# Patient Record
Sex: MALE | Race: ASIAN | NOT HISPANIC OR LATINO | Employment: PART TIME | URBAN - METROPOLITAN AREA
[De-identification: names, ages, dates, MRNs, and addresses within clinical notes are randomized per-mention and may not be internally consistent; named-entity substitution may affect disease eponyms.]

---

## 2022-06-16 ENCOUNTER — TRANSFERRED RECORDS (OUTPATIENT)
Dept: HEALTH INFORMATION MANAGEMENT | Facility: CLINIC | Age: 22
End: 2022-06-16

## 2022-06-27 ENCOUNTER — TRANSFERRED RECORDS (OUTPATIENT)
Dept: HEALTH INFORMATION MANAGEMENT | Facility: CLINIC | Age: 22
End: 2022-06-27

## 2022-06-29 ENCOUNTER — MEDICAL CORRESPONDENCE (OUTPATIENT)
Dept: HEALTH INFORMATION MANAGEMENT | Facility: CLINIC | Age: 22
End: 2022-06-29

## 2022-07-06 ENCOUNTER — TRANSCRIBE ORDERS (OUTPATIENT)
Dept: OTHER | Age: 22
End: 2022-07-06

## 2022-07-06 DIAGNOSIS — M54.17 LUMBOSACRAL RADICULOPATHY AT S1: ICD-10-CM

## 2022-07-06 DIAGNOSIS — M51.27 LUMBOSACRAL DISC HERNIATION: Primary | ICD-10-CM

## 2022-07-09 NOTE — TELEPHONE ENCOUNTER
Action July 8, 2022 10:27 PM MT   Action Taken CSS sent a req for imgs from [facility] [phone].     Action July 11, 2022 8:13 AM MT   Action Taken CSS recvd and resolved imgs to PACS.      DIAGNOSIS: Low back pain for 1 year / MRI @ [facility] / Dr. Krishna Julian - [facility] / Ortho consult   APPOINTMENT DATE: 07/11/2022   NOTES STATUS DETAILS   OFFICE NOTE from referring provider SPINE REFERRAL/Media Tab 07/06/2022, 06/16/2022 - Krishna Julian MD - [facility]   OFFICE NOTE from other specialist Care Everywhere 05/04/2021 - Cem Briceno PA-C - [facility]   LABS     CBC/DIFF Care Everywhere 05/22/2021   MRI PACS [facility]: 06/27/2022 - [facility]

## 2022-07-11 ENCOUNTER — PRE VISIT (OUTPATIENT)
Dept: ORTHOPEDICS | Facility: CLINIC | Age: 22
End: 2022-07-11

## 2022-07-11 ENCOUNTER — OFFICE VISIT (OUTPATIENT)
Dept: ORTHOPEDICS | Facility: CLINIC | Age: 22
End: 2022-07-11
Payer: COMMERCIAL

## 2022-07-11 VITALS — BODY MASS INDEX: 27.92 KG/M2 | HEIGHT: 70 IN | WEIGHT: 195 LBS

## 2022-07-11 DIAGNOSIS — M54.50 LUMBAR PAIN: Primary | ICD-10-CM

## 2022-07-11 DIAGNOSIS — M54.17 LUMBOSACRAL RADICULOPATHY AT S1: ICD-10-CM

## 2022-07-11 DIAGNOSIS — M51.27 LUMBOSACRAL DISC HERNIATION: ICD-10-CM

## 2022-07-11 PROCEDURE — 99204 OFFICE O/P NEW MOD 45 MIN: CPT | Performed by: PHYSICIAN ASSISTANT

## 2022-07-11 RX ORDER — ALBUTEROL SULFATE 90 UG/1
2 AEROSOL, METERED RESPIRATORY (INHALATION) EVERY 4 HOURS PRN
COMMUNITY
Start: 2022-05-20

## 2022-07-11 RX ORDER — FLUTICASONE PROPIONATE AND SALMETEROL 50; 250 UG/1; UG/1
POWDER RESPIRATORY (INHALATION) 2 TIMES DAILY
Status: ON HOLD | COMMUNITY
Start: 2022-06-29 | End: 2022-08-12

## 2022-07-11 NOTE — PROGRESS NOTES
Spine Surgery Consultation    REFERRING PHYSICIAN: Krishna Julian   PRIMARY CARE PHYSICIAN: No primary care provider on file.           Chief Complaint:   Consult (Low back pain, has been having sciatic pain on the right for the past year and has gotten worse about 6 months ago, has tried PT and some prednisone with little relief. No previous surgeries on his spine )      History of Present Illness:  Symptom Profile Including: location of symptoms, onset, severity, exacerbating/alleviating factors, previous treatments:        Rishi Noguera is a 22 year old male who presents today for evaluation of right radicular symptoms.  Patient says that he initially injured his low back about 1 year ago when he was working out.  Things initially got better on their own, but for the past 6 months things have been gradually worsening.  He did physical therapy for over a month without improvement of symptoms.  He had Medrol Dosepak which helped for only a short-term.  He has also tried Tylenol with codeine which helps as needed to help with sleep.  Pain is localized to right leg radiating from buttock into posterior lateral thigh and calf and down to the bottom of his foot in an S1 distribution.  He denies left leg symptoms.  Denies weakness in the legs.  Denies bowel/bladder incontinence or retention.  No other red flag symptoms.    Past treatments tried:  - Physical therapy: Just completed, minimal relief  - Injections: None  - Medications: Medrol Dosepak, Tylenol with codeine    PMH:  Asthma    Social:  Denies tobacco product usage  Occasional marijuana use  Works at [employer] in [employer] department, some heavy lifting.         Past Medical History:   History reviewed. No pertinent past medical history.         Past Surgical History:   History reviewed. No pertinent surgical history.         Social History:     Social History     Tobacco Use     Smoking status: Never Smoker     Smokeless tobacco: Never Used   Substance Use  "Topics     Alcohol use: Not on file            Family History:   History reviewed. No pertinent family history.         Allergies:   No Known Allergies         Medications:     Current Outpatient Medications   Medication     ADVAIR DISKUS 250-50 MCG/ACT inhaler     albuterol (PROAIR HFA/PROVENTIL HFA/VENTOLIN HFA) 108 (90 Base) MCG/ACT inhaler     No current facility-administered medications for this visit.             Review of Systems:     A 10 point ROS was performed and reviewed. Specific responses to these questions are noted at the end of the document.         Physical Exam:     PHYSICAL EXAM:   Constitutional - Patient is healthy, well-nourished and appears stated age.    Vitals: Ht 1.778 m (5' 10\")   Wt 88.5 kg (195 lb)   BMI 27.98 kg/m     Respiratory - Patient is breathing normally and in no respiratory distress.   Skin - No suspicious rashes or lesions.   Psychiatric - Normal mood and affect.   Cardiovascular - Extremities warm and well perfused.   Eyes - Visual acuity is normal to the written word.   ENT - Hearing intact to the spoken word.   GI - No abdominal distention.   Musculoskeletal - Non-antalgic gait without use of assistive devices.        Thoracic Spine:    Appearance - Normal    Palpation - Non-tender to palpation    Strength/ROM - deferred            Lumbar Spine:    Appearance - Normal     Palpation - Non-tender to palpation    ROM - Full     Motor -no weakness with heel walk or toe walk bilaterally.       LOWER EXTREMITY Left Right   Hip flexion 5/5 5/5   Knee flexion 5/5 5/5   Knee extension 5/5 5/5   Ankle dorsiflexion 5/5 5/5   Ankle plantarflexion 5/5 5/5   Great toe extension 5/5 5/5        Special tests -     Straight leg raise - positive right     Neurologic - Sensation intact to light touch bilaterally. Achilles and patellar reflexes +1 bilateral. Babinski downdoing. 0 beats clonus        Alignment:  Patient stands with a neutral standing sagittal balance.         Imaging:   We " ordered and independently reviewed new radiographs at this clinic visit. The results were discussed with the patient.  Findings include:    7/11/2022 XR lumbar spine AP/lateral views: No fracture.  No spondylolisthesis.  No scoliosis.  Neutral sagittal alignment.    6/27/2022 MRI lumbar spine without contrast: Right paracentral disc herniation L5-S1 with impingement of descending S1 nerve root.             Assessment and Plan:   Assessment:  22 year old male with right S1 radiculopathy due to right L5-S1 disc herniation      Plan:  Reviewed imaging with patient. He has significant herniation to explain his symptoms. He has exhausted non-operative treatment (physical therapy, medications, rest) options for over a year without improvement. Surgery is a reasonable next step. Recommend decompression surgery. We will start the planning steps for this.    Risks of this surgery include risk of infection, risk of dural tear resulting in CSF leak which might result in headaches, or possible need for lumbar drain, or possible revision surgery in the setting of a persistent leak. Risk of hematoma or seroma resulting in wound complications.  Possible nerve root injury resulting in numbness weakness or paralysis into the arms or legs. Possible radiculitis which could result in similar symptoms or could result in significant neurogenic type pain. There is also a risk of delayed onset nerve root pals or radiculitis, which I explained is potentially due to stretch injury or possibly due to delayed onset swelling, and is sometimes temporary but can also be permanent.  Risk of incomplete decompression which might require revision surgery in the future.   Risk of incomplete relief of symptoms possibly requiring revision surgery in the future. Furthermore, although rare, there are risks of major vessel injury such as to the major vessels anteriorly or to the bowel from the surgery.  Sometimes this can happen if an instrument is passed  anteriorly through the disc space. There is a risk of blood clots in the legs or the lungs.  Lastly, although rare, there are certainly risks of the anesthetic including stroke heart attack and death. Patient understands risks and wishes to proceed with surgery.  - PAC  - surgery: MIS L5-S1 Right microdiscectomy    Patient seen and plan discussed with Dr. Mabry.   Delma Mckenzie (precious Shetty), PA-C    Respectfully,  Koby Mabry MD  Spine Surgery  [facility]    I, Koby Mabry MD, saw and evaluated Rishi Noguera  2000.  I have reviewed and discussed with the advanced practice provider their history, physical and plan.    I have personally reviewed the imaging, history, and physical exam.    I personally provided substantive care for this patient, including personally interpreting the imaging.  In addition, I formulated the entire plan noted above and dictated this to the PA/APRN so they could document it in the note. The REYNALDO is acting as a Scribe for this note. The plan represents my own Medical Decision making, which I determined in its entirety.      Koby Mabry MD

## 2022-07-11 NOTE — NURSING NOTE
"Reason For Visit:   Chief Complaint   Patient presents with     Consult     Low back pain, has been having sciatic pain on the right for the past year and has gotten worse about 6 months ago, has tried PT and some prednisone with little relief. No previous surgeries on his spine        Primary MD: No primary care provider on file.  Ref. MD: Iesha    ?  No    Date of injury: about 1 year ago   Type of injury: chronic .  Date of surgery: none   Type of surgery: none .  Smoker: No  Request smoking cessation information: No    Ht 1.778 m (5' 10\")   Wt 88.5 kg (195 lb)   BMI 27.98 kg/m           Oswestry (JAYLYN) Questionnaire    No flowsheet data found.         Neck Disability Index (NDI) Questionnaire    No flowsheet data found.                Promis 10 Assessment    No flowsheet data found.             Norman Allen, ATC  "

## 2022-07-11 NOTE — LETTER
7/11/2022         RE: Rishi Noguera  [address] [address]        Dear Colleague,    Thank you for referring your patient, Rishi Noguera, to the [facility] [place]. Please see a copy of my visit note below.    Spine Surgery Consultation    REFERRING PHYSICIAN: Krishna Julian   PRIMARY CARE PHYSICIAN: No primary care provider on file.           Chief Complaint:   Consult (Low back pain, has been having sciatic pain on the right for the past year and has gotten worse about 6 months ago, has tried PT and some prednisone with little relief. No previous surgeries on his spine )      History of Present Illness:  Symptom Profile Including: location of symptoms, onset, severity, exacerbating/alleviating factors, previous treatments:        iRshi Noguera is a 22 year old male who presents today for evaluation of right radicular symptoms.  Patient says that he initially injured his low back about 1 year ago when he was working out.  Things initially got better on their own, but for the past 6 months things have been gradually worsening.  He did physical therapy for over a month without improvement of symptoms.  He had Medrol Dosepak which helped for only a short-term.  He has also tried Tylenol with codeine which helps as needed to help with sleep.  Pain is localized to right leg radiating from buttock into posterior lateral thigh and calf and down to the bottom of his foot in an S1 distribution.  He denies left leg symptoms.  Denies weakness in the legs.  Denies bowel/bladder incontinence or retention.  No other red flag symptoms.    Past treatments tried:  - Physical therapy: Just completed, minimal relief  - Injections: None  - Medications: Medrol Dosepak, Tylenol with codeine    PMH:  Asthma    Social:  Denies tobacco product usage  Occasional marijuana use  Works at [employer] in [employer] department, some heavy lifting.         Past Medical History:   History reviewed. No  "pertinent past medical history.         Past Surgical History:   History reviewed. No pertinent surgical history.         Social History:     Social History     Tobacco Use     Smoking status: Never Smoker     Smokeless tobacco: Never Used   Substance Use Topics     Alcohol use: Not on file            Family History:   History reviewed. No pertinent family history.         Allergies:   No Known Allergies         Medications:     Current Outpatient Medications   Medication     ADVAIR DISKUS 250-50 MCG/ACT inhaler     albuterol (PROAIR HFA/PROVENTIL HFA/VENTOLIN HFA) 108 (90 Base) MCG/ACT inhaler     No current facility-administered medications for this visit.             Review of Systems:     A 10 point ROS was performed and reviewed. Specific responses to these questions are noted at the end of the document.         Physical Exam:     PHYSICAL EXAM:   Constitutional - Patient is healthy, well-nourished and appears stated age.    Vitals: Ht 1.778 m (5' 10\")   Wt 88.5 kg (195 lb)   BMI 27.98 kg/m     Respiratory - Patient is breathing normally and in no respiratory distress.   Skin - No suspicious rashes or lesions.   Psychiatric - Normal mood and affect.   Cardiovascular - Extremities warm and well perfused.   Eyes - Visual acuity is normal to the written word.   ENT - Hearing intact to the spoken word.   GI - No abdominal distention.   Musculoskeletal - Non-antalgic gait without use of assistive devices.        Thoracic Spine:    Appearance - Normal    Palpation - Non-tender to palpation    Strength/ROM - deferred            Lumbar Spine:    Appearance - Normal     Palpation - Non-tender to palpation    ROM - Full     Motor -no weakness with heel walk or toe walk bilaterally.       LOWER EXTREMITY Left Right   Hip flexion 5/5 5/5   Knee flexion 5/5 5/5   Knee extension 5/5 5/5   Ankle dorsiflexion 5/5 5/5   Ankle plantarflexion 5/5 5/5   Great toe extension 5/5 5/5        Special tests -     Straight leg raise - " positive right     Neurologic - Sensation intact to light touch bilaterally. Achilles and patellar reflexes +1 bilateral. Babinski downdoing. 0 beats clonus        Alignment:  Patient stands with a neutral standing sagittal balance.         Imaging:   We ordered and independently reviewed new radiographs at this clinic visit. The results were discussed with the patient.  Findings include:    7/11/2022 XR lumbar spine AP/lateral views: No fracture.  No spondylolisthesis.  No scoliosis.  Neutral sagittal alignment.    6/27/2022 MRI lumbar spine without contrast: Right paracentral disc herniation L5-S1 with impingement of descending S1 nerve root.             Assessment and Plan:   Assessment:  22 year old male with right S1 radiculopathy due to right L5-S1 disc herniation      Plan:  Reviewed imaging with patient. He has significant herniation to explain his symptoms. He has exhausted non-operative treatment (physical therapy, medications, rest) options for over a year without improvement. Surgery is a reasonable next step. Recommend decompression surgery. We will start the planning steps for this.    Risks of this surgery include risk of infection, risk of dural tear resulting in CSF leak which might result in headaches, or possible need for lumbar drain, or possible revision surgery in the setting of a persistent leak. Risk of hematoma or seroma resulting in wound complications.  Possible nerve root injury resulting in numbness weakness or paralysis into the arms or legs. Possible radiculitis which could result in similar symptoms or could result in significant neurogenic type pain. There is also a risk of delayed onset nerve root pals or radiculitis, which I explained is potentially due to stretch injury or possibly due to delayed onset swelling, and is sometimes temporary but can also be permanent.  Risk of incomplete decompression which might require revision surgery in the future.   Risk of incomplete relief of  symptoms possibly requiring revision surgery in the future. Furthermore, although rare, there are risks of major vessel injury such as to the major vessels anteriorly or to the bowel from the surgery.  Sometimes this can happen if an instrument is passed anteriorly through the disc space. There is a risk of blood clots in the legs or the lungs.  Lastly, although rare, there are certainly risks of the anesthetic including stroke heart attack and death. Patient understands risks and wishes to proceed with surgery.  - PAC  - surgery: MIS L5-S1 Right microdiscectomy    Patient seen and plan discussed with Dr. Mabry.   Delma Mckenzie (precious Shetty), TC    Respectfully,  Koby Mabry MD  Spine Surgery  [facility]    I, Koby Mabry MD, saw and evaluated Rishi Noguera  2000.  I have reviewed and discussed with the advanced practice provider their history, physical and plan.    I have personally reviewed the imaging, history, and physical exam.    I personally provided substantive care for this patient, including personally interpreting the imaging.  In addition, I formulated the entire plan noted above and dictated this to the PA/APRN so they could document it in the note. The REYNALDO is acting as a Scribe for this note. The plan represents my own Medical Decision making, which I determined in its entirety.      Koby Mabry MD

## 2022-07-14 ENCOUNTER — TELEPHONE (OUTPATIENT)
Dept: ORTHOPEDICS | Facility: CLINIC | Age: 22
End: 2022-07-14

## 2022-07-14 NOTE — TELEPHONE ENCOUNTER
FUTURE VISIT INFORMATION      SURGERY INFORMATION:    Date: 22    Location: ur or    Surgeon:  Koby Mabry MD    Anesthesia Type:  general    Procedure: Minimally Invasive Right Lumbar 5 to Sacral 1 Microdiscectomy with Microscope and METRX tubes    Consult: ov     RECORDS REQUESTED FROM:       Most recent EKG+ Tracin21-

## 2022-07-14 NOTE — TELEPHONE ENCOUNTER
Phoned patient to schedule surgery with Dr Mabry. I left him my direct number to call back at his convenience. [phone]

## 2022-07-14 NOTE — TELEPHONE ENCOUNTER
Patient is scheduled for surgery with Dr. Mabry    Spoke with: Patient    Date of Surgery: 8/12/22    Location: [place]    Post op: 6 weeks    Pre op with Provider: Complete    H&P: Scheduled with PAC 7/22/22    Pre-procedure COVID-19 Test: Will do home test    Additional imaging/appointments: N/A    Surgery packet: Received in clinic     Additional comments: N/A

## 2022-07-21 ENCOUNTER — TELEPHONE (OUTPATIENT)
Dept: ORTHOPEDICS | Facility: CLINIC | Age: 22
End: 2022-07-21

## 2022-07-21 NOTE — TELEPHONE ENCOUNTER
RN called and spoke with Ashanti. Gave her Carmelina D number to call.  Ashanti expressed understanding.    Dianna Monzon RN         Health Call Center    Phone Message    May a detailed message be left on voicemail: no     Reason for Call: Other: Ashanti @ [facility] doing prior auth for patient's surgery. She needs:  CPT code surgery will be under  Name of hospital where patient is having surgery    Action Taken: Message routed to:  [facility] ([facility] [[email or web address]] ORTHO    Travel Screening: Not Applicable

## 2022-07-22 ENCOUNTER — LAB (OUTPATIENT)
Dept: LAB | Facility: CLINIC | Age: 22
End: 2022-07-22
Payer: COMMERCIAL

## 2022-07-22 ENCOUNTER — PRE VISIT (OUTPATIENT)
Dept: SURGERY | Facility: CLINIC | Age: 22
End: 2022-07-22

## 2022-07-22 ENCOUNTER — ANESTHESIA EVENT (OUTPATIENT)
Dept: SURGERY | Facility: CLINIC | Age: 22
End: 2022-07-22

## 2022-07-22 ENCOUNTER — OFFICE VISIT (OUTPATIENT)
Dept: SURGERY | Facility: CLINIC | Age: 22
End: 2022-07-22
Payer: COMMERCIAL

## 2022-07-22 VITALS
SYSTOLIC BLOOD PRESSURE: 121 MMHG | WEIGHT: 197.7 LBS | OXYGEN SATURATION: 95 % | TEMPERATURE: 98 F | HEART RATE: 71 BPM | DIASTOLIC BLOOD PRESSURE: 83 MMHG | RESPIRATION RATE: 20 BRPM | HEIGHT: 69 IN | BODY MASS INDEX: 29.28 KG/M2

## 2022-07-22 DIAGNOSIS — Z01.818 PRE-OP EXAMINATION: ICD-10-CM

## 2022-07-22 DIAGNOSIS — Z01.818 PRE-OP EXAMINATION: Primary | ICD-10-CM

## 2022-07-22 DIAGNOSIS — M51.27 LUMBOSACRAL DISC HERNIATION: ICD-10-CM

## 2022-07-22 LAB
ANION GAP SERPL CALCULATED.3IONS-SCNC: 7 MMOL/L (ref 3–14)
BUN SERPL-MCNC: 10 MG/DL (ref 7–30)
CALCIUM SERPL-MCNC: 9.4 MG/DL (ref 8.5–10.1)
CHLORIDE BLD-SCNC: 107 MMOL/L (ref 94–109)
CO2 SERPL-SCNC: 27 MMOL/L (ref 20–32)
CREAT SERPL-MCNC: 0.8 MG/DL (ref 0.66–1.25)
ERYTHROCYTE [DISTWIDTH] IN BLOOD BY AUTOMATED COUNT: 12.5 % (ref 10–15)
GFR SERPL CREATININE-BSD FRML MDRD: >90 ML/MIN/1.73M2
GLUCOSE BLD-MCNC: 102 MG/DL (ref 70–99)
HCT VFR BLD AUTO: 50.2 % (ref 40–53)
HGB BLD-MCNC: 17.2 G/DL (ref 13.3–17.7)
MCH RBC QN AUTO: 29 PG (ref 26.5–33)
MCHC RBC AUTO-ENTMCNC: 34.3 G/DL (ref 31.5–36.5)
MCV RBC AUTO: 85 FL (ref 78–100)
PLATELET # BLD AUTO: 300 10E3/UL (ref 150–450)
POTASSIUM BLD-SCNC: 4.4 MMOL/L (ref 3.4–5.3)
RBC # BLD AUTO: 5.93 10E6/UL (ref 4.4–5.9)
SODIUM SERPL-SCNC: 141 MMOL/L (ref 133–144)
WBC # BLD AUTO: 6.7 10E3/UL (ref 4–11)

## 2022-07-22 PROCEDURE — 99203 OFFICE O/P NEW LOW 30 MIN: CPT | Performed by: PHYSICIAN ASSISTANT

## 2022-07-22 PROCEDURE — 80048 BASIC METABOLIC PNL TOTAL CA: CPT | Performed by: PATHOLOGY

## 2022-07-22 PROCEDURE — 85027 COMPLETE CBC AUTOMATED: CPT | Performed by: PATHOLOGY

## 2022-07-22 PROCEDURE — 36415 COLL VENOUS BLD VENIPUNCTURE: CPT | Performed by: PATHOLOGY

## 2022-07-22 RX ORDER — MONTELUKAST SODIUM 10 MG/1
10 TABLET ORAL AT BEDTIME
Status: ON HOLD | COMMUNITY
End: 2022-08-12

## 2022-07-22 ASSESSMENT — PAIN SCALES - GENERAL: PAINLEVEL: SEVERE PAIN (6)

## 2022-07-22 NOTE — PATIENT INSTRUCTIONS
Preparing for Your Surgery      Name:  Rishi Noguera   MRN:  [id number]   :  2000   Today's Date:  2022       Arriving for surgery:  Surgery date:  22  Arrival time:  07:30 am    Restrictions due to COVID 19       Effective 22 [facility] is implementing the following visitor policy:     1 person may accompany the patient through the Pre-Op process.      That same person may wait in the Surgery Waiting room, provided there is enough room to social distance           Visitors must wear a mask.      Visitors must not be ill.        Inpatients are allowed 2 visitors per day for the duration of their stay.      Visiting hours are 8 am to 8 pm.    [employer] parking is available for anyone with mobility limitations or disabilities.  ([place]  24 hours/ 7 days a week; [facility]  7 am- 3:30 pm, Mon- Fri)    Please come to:   [facility] [address]  [address] [place], [place] [address]. [place]  [address]  -come in the front of [facility] Entrance. Park your car in the Green Lot.  -Proceed to the 3rd floor, check in at the Adult Surgery Waiting Lounge. [phone]    If an escort is needed stop at the Information Desk in the lobby. Inform the information person that you are here for surgery. An escort to the Adult Surgery Waiting Lounge will be provided.    What can I eat or drink?  -  You may eat and drink normally for up to 8 hours before your surgery.   -  You may have clear liquids until 2 hours before surgery.     Examples of clear liquids:  Water  Clear broth  Juices (apple, white grape, white cranberry  and cider) without pulp  Noncarbonated, powder based beverages  (lemonade and Kurtis-Aid)  Sodas (Sprite, 7-Up, ginger ale and seltzer)  Coffee or tea (without milk or cream)  Gatorade    -  No Alcohol for at least 24 hours before surgery     Which medicines can I take?  Hold Aspirin for 7 days before surgery.   Hold Multivitamins for 7 days  before surgery.  Hold Supplements for 7 days before surgery.       Hold Naproxen (Aleve) for 4 days before surgery.  Hold all NSAIDS for 7 days before spine surgery. (Ibuprofen, Naproxen, Celebrex, Indocin, Diclofenac).  -  PLEASE TAKE these medications the day of surgery:  Tylenol if needed; take morning medication.  Bring inhaler if using.    How do I prepare myself?  - Please take 2 showers before surgery using Scrubcare or Hibiclens soap.    Use this soap only from the neck to your toes.     Leave the soap on your skin for one minute--then rinse thoroughly.      You may use your own shampoo and conditioner; no other hair products.   - Please remove all jewelry and body piercings.  - No lotions, deodorants or fragrance.  - No makeup or fingernail polish.   - Bring your ID and insurance card.    -If you have a Deep Brain Stimulator, Spinal Cord Stimulator or any neuro stimulator device---you must bring the remote control to the hospital       ALL PATIENTS GOING HOME THE SAME DAY OF SURGERY ARE REQUIRED TO HAVE A RESPONSIBLE ADULT TO DRIVE AND BE IN ATTENDANCE WITH THEM FOR 24 HOURS FOLLOWING SURGERY.      Questions or Concerns:    - For any questions regarding the day of surgery or your hospital stay, please contact the Pre Admission Nursing Office at [phone].       - If you have health changes between today and your surgery please call your surgeon.       For questions after surgery please call your surgeons office.

## 2022-07-22 NOTE — H&P (VIEW-ONLY)
Pre-Operative H & P     CC:  Preoperative exam to assess for increased cardiopulmonary risk while undergoing surgery and anesthesia.    Date of Encounter: 7/22/2022  Primary Care Physician:  No primary care provider on file.     Reason for visit:   Encounter Diagnoses   Name Primary?     Pre-op examination Yes     Lumbosacral disc herniation        HPI  Rishi Noguera is a 22 year old male who presents for pre-operative H & P in preparation for  Procedure Information     Date/Time: 8/12/22     Procedure: Minimally Invasive Right Lumbar 5 to Sacral 1 Microdiscectomy with Microscope and METRX tubes    Anesthesia type: General     Pre-op diagnosis: Lumbosacral disc herniation    Location: [facility]    Providers: Dr. Mabry           The patient is a 22-year-old man with past medical history significant for asthma, headaches and marijuana use.  He has been having ongoing back pain that goes down his leg after working out.  He has tried physical therapy without improvement in his symptoms.  He was seen by Dr. Mabry on 7/11/2022 to discuss surgical treatment options.  The patient is now scheduled for the procedure as above.    History is obtained from the patient and chart review    Hx of abnormal bleeding or anti-platelet use: none      Past Medical History  Past Medical History:   Diagnosis Date     Asthma      Headache      Lumbar disc herniation        Past Surgical History  Past Surgical History:   Procedure Laterality Date     wisdom teeth extraction         Prior to Admission Medications  Current Outpatient Medications   Medication Sig Dispense Refill     ADVAIR DISKUS 250-50 MCG/ACT inhaler 2 times daily       albuterol (PROAIR HFA/PROVENTIL HFA/VENTOLIN HFA) 108 (90 Base) MCG/ACT inhaler as needed       montelukast (SINGULAIR) 10 MG tablet Take 10 mg by mouth At Bedtime         Allergies  No Known Allergies    Social History  Social History     Socioeconomic  History     Marital status: Single     Spouse name: Not on file     Number of children: Not on file     Years of education: Not on file     Highest education level: Not on file   Occupational History     Not on file   Tobacco Use     Smoking status: Never Smoker     Smokeless tobacco: Never Used   Substance and Sexual Activity     Alcohol use: Not on file     Drug use: Yes     Types: Marijuana     Sexual activity: Not on file   Other Topics Concern     Not on file   Social History Narrative     Not on file     Social Determinants of Health     Financial Resource Strain: Not on file   Food Insecurity: Not on file   Transportation Needs: Not on file   Physical Activity: Not on file   Stress: Not on file   Social Connections: Not on file   Intimate Partner Violence: Not on file   Housing Stability: Not on file       Family History  Family History   Problem Relation Age of Onset     Anesthesia Reaction No family hx of      Deep Vein Thrombosis (DVT) No family hx of        Review of Systems  The complete review of systems is negative other than noted in the HPI or here.   Anesthesia Evaluation   Pt has had prior anesthetic. Type of anesthetic: wisdom teeth.        ROS/MED HX  ENT/Pulmonary:     (+) Mild Persistent, asthma Treatment: Inhaler prn, Oral steroids and Inhaler daily,      Neurologic: Comment: Headache        Cardiovascular:     (+) -----Previous cardiac testing   Echo: Date: Results:    Stress Test: Date: Results:    ECG Reviewed: Date: 5/22/21 Results:  NSR    Cath: Date: Results:      METS/Exercise Tolerance: 4 - Raking leaves, gardening    Hematologic:  - neg hematologic  ROS     Musculoskeletal: Comment: Lumbosacral disc herniation      GI/Hepatic:  - neg GI/hepatic ROS     Renal/Genitourinary:  - neg Renal ROS     Endo:  - neg endo ROS     Psychiatric/Substance Use:  - neg psychiatric ROS   (+) Recreational drug usage: Cannabis.    Infectious Disease:  - neg infectious disease ROS     Malignancy:  - neg  "malignancy ROS     Other:  - neg other ROS          /83 (BP Location: Right arm, Patient Position: Chair, Cuff Size: Adult Regular)   Pulse 71   Temp 98  F (36.7  C) (Oral)   Resp 20   Ht 1.753 m (5' 9\")   Wt 89.7 kg (197 lb 11.2 oz)   SpO2 95%   BMI 29.20 kg/m      Physical Exam   Constitutional: Awake, alert, cooperative, no apparent distress, and appears stated age.  Eyes: Pupils equal, round and reactive to light, extra ocular muscles intact, sclera clear, conjunctiva normal.  HENT: Normocephalic, oral pharynx with moist mucus membranes, good dentition. No goiter appreciated.   Respiratory: Clear to auscultation bilaterally, no crackles or wheezing.  Cardiovascular: Regular rate and rhythm, normal S1 and S2, and no murmur noted.  Carotids +2, no bruits. No edema. Palpable pulses to radial  DP and PT arteries.   GI: Normal bowel sounds, soft, non-distended, non-tender, no masses palpated, no hepatosplenomegaly.    Lymph/Hematologic: No cervical lymphadenopathy and no supraclavicular lymphadenopathy.  Genitourinary:  defer  Skin: Warm and dry.  No rashes at anticipated surgical site.   Musculoskeletal: Full ROM of neck. There is no redness, warmth, or swelling of the joints. Gross motor strength is normal.    Neurologic: Awake, alert, oriented to name, place and time. Cranial nerves II-XII are grossly intact. Gait is normal.   Neuropsychiatric: Calm, cooperative. Normal affect.     Prior Labs/Diagnostic Studies   All labs and imaging personally reviewed     EKG/ stress test - if available please see in ROS above     The patient's records and results personally reviewed by this provider.     Outside records reviewed from: Care Everywhere    LAB/DIAGNOSTIC STUDIES TODAY:     Latest Reference Range & Units 07/22/22 11:58   Sodium 133 - 144 mmol/L 141   Potassium 3.4 - 5.3 mmol/L 4.4   Chloride 94 - 109 mmol/L 107   Carbon Dioxide 20 - 32 mmol/L 27   Urea Nitrogen 7 - 30 mg/dL 10   Creatinine 0.66 - 1.25 " "mg/dL 0.80   GFR Estimate >60 mL/min/1.73m2 >90   Calcium 8.5 - 10.1 mg/dL 9.4   Anion Gap 3 - 14 mmol/L 7   Glucose 70 - 99 mg/dL 102 (H)   WBC 4.0 - 11.0 10e3/uL 6.7   Hemoglobin 13.3 - 17.7 g/dL 17.2   Hematocrit 40.0 - 53.0 % 50.2   Platelet Count 150 - 450 10e3/uL 300   RBC Count 4.40 - 5.90 10e6/uL 5.93 (H)   MCV 78 - 100 fL 85   MCH 26.5 - 33.0 pg 29.0   MCHC 31.5 - 36.5 g/dL 34.3   RDW 10.0 - 15.0 % 12.5     Assessment      Rishi Noguera is a 22 year old male seen as a PAC referral for risk assessment and optimization for anesthesia.    Plan/Recommendations  Pt will be optimized for the proposed procedure.  See below for details on the assessment, risk, and preoperative recommendations    NEUROLOGY  - headaches - the patient takes PRN tylenol  -Post Op delirium risk factors:  No risk identified    ENT  - No current airway concerns.  Will need to be reassessed day of surgery.  Mallampati: I  TM: > 3    CARDIAC  - No history of CAD, Hypertension and Afib  - METS (Metabolic Equivalents)  Patient performs 4 or more METS exercise without symptoms            Total Score: 0      RCRI-Very low risk: Class 1 0.4% complication rate            Total Score: 0        PULMONARY  - Obstructive Sleep Apnea  No current risk of obstructive sleep apnea   QUIN Low Risk            Total Score: 1    QUIN: Male      - Asthma  Well controlled - the patient does report he's been having more mucous but denies URI symptoms. Lungs are clear on exam.   - Tobacco History      History   Smoking Status     Never Smoker   Smokeless Tobacco     Never Used       GI  PONV Medium Risk  Total Score: 2           1 AN PONV: Patient is not a current smoker    1 AN PONV: Intended Post Op Opioids        /RENAL  - Baseline Creatinine  1.00    ENDOCRINE    - BMI: Estimated body mass index is 29.2 kg/m  as calculated from the following:    Height as of this encounter: 1.753 m (5' 9\").    Weight as of this encounter: 89.7 kg (197 lb 11.2 oz).  Overweight " (BMI 25.0-29.9)  - No history of Diabetes Mellitus    HEME  VTE Low Risk 0.5%            Total Score: 2    VTE: Male      - No history of abnormal bleeding or antiplatelet use.    MSK  ~ Lumbosacral disc herniation - procedure as above.     OTHER  - The patient will not smoke marijuana for 24 hours prior    The patient is optimized for their procedure. AVS with information on surgery time/arrival time, meds and NPO status given by nursing staff. No further diagnostic testing indicated.      On the day of service:     Prep time: 4 minutes  Visit time: 9 minutes  Documentation time: 8 minutes  ------------------------------------------  Total time: 21 minutes      Brianna Iglesias PA-C  Preoperative Assessment Center  [facility]  [facility]  Phone: [phone]  Fax: [phone]

## 2022-07-22 NOTE — H&P
Pre-Operative H & P     CC:  Preoperative exam to assess for increased cardiopulmonary risk while undergoing surgery and anesthesia.    Date of Encounter: 7/22/2022  Primary Care Physician:  No primary care provider on file.     Reason for visit:   Encounter Diagnoses   Name Primary?     Pre-op examination Yes     Lumbosacral disc herniation        HPI  Rishi Noguera is a 22 year old male who presents for pre-operative H & P in preparation for  Procedure Information     Date/Time: 8/12/22     Procedure: Minimally Invasive Right Lumbar 5 to Sacral 1 Microdiscectomy with Microscope and METRX tubes    Anesthesia type: General     Pre-op diagnosis: Lumbosacral disc herniation    Location: [facility]    Providers: Dr. Mabry           The patient is a 22-year-old man with past medical history significant for asthma, headaches and marijuana use.  He has been having ongoing back pain that goes down his leg after working out.  He has tried physical therapy without improvement in his symptoms.  He was seen by Dr. Mabry on 7/11/2022 to discuss surgical treatment options.  The patient is now scheduled for the procedure as above.    History is obtained from the patient and chart review    Hx of abnormal bleeding or anti-platelet use: none      Past Medical History  Past Medical History:   Diagnosis Date     Asthma      Headache      Lumbar disc herniation        Past Surgical History  Past Surgical History:   Procedure Laterality Date     wisdom teeth extraction         Prior to Admission Medications  Current Outpatient Medications   Medication Sig Dispense Refill     ADVAIR DISKUS 250-50 MCG/ACT inhaler 2 times daily       albuterol (PROAIR HFA/PROVENTIL HFA/VENTOLIN HFA) 108 (90 Base) MCG/ACT inhaler as needed       montelukast (SINGULAIR) 10 MG tablet Take 10 mg by mouth At Bedtime         Allergies  No Known Allergies    Social History  Social History     Socioeconomic  History     Marital status: Single     Spouse name: Not on file     Number of children: Not on file     Years of education: Not on file     Highest education level: Not on file   Occupational History     Not on file   Tobacco Use     Smoking status: Never Smoker     Smokeless tobacco: Never Used   Substance and Sexual Activity     Alcohol use: Not on file     Drug use: Yes     Types: Marijuana     Sexual activity: Not on file   Other Topics Concern     Not on file   Social History Narrative     Not on file     Social Determinants of Health     Financial Resource Strain: Not on file   Food Insecurity: Not on file   Transportation Needs: Not on file   Physical Activity: Not on file   Stress: Not on file   Social Connections: Not on file   Intimate Partner Violence: Not on file   Housing Stability: Not on file       Family History  Family History   Problem Relation Age of Onset     Anesthesia Reaction No family hx of      Deep Vein Thrombosis (DVT) No family hx of        Review of Systems  The complete review of systems is negative other than noted in the HPI or here.   Anesthesia Evaluation   Pt has had prior anesthetic. Type of anesthetic: wisdom teeth.        ROS/MED HX  ENT/Pulmonary:     (+) Mild Persistent, asthma Treatment: Inhaler prn, Oral steroids and Inhaler daily,      Neurologic: Comment: Headache        Cardiovascular:     (+) -----Previous cardiac testing   Echo: Date: Results:    Stress Test: Date: Results:    ECG Reviewed: Date: 5/22/21 Results:  NSR    Cath: Date: Results:      METS/Exercise Tolerance: 4 - Raking leaves, gardening    Hematologic:  - neg hematologic  ROS     Musculoskeletal: Comment: Lumbosacral disc herniation      GI/Hepatic:  - neg GI/hepatic ROS     Renal/Genitourinary:  - neg Renal ROS     Endo:  - neg endo ROS     Psychiatric/Substance Use:  - neg psychiatric ROS   (+) Recreational drug usage: Cannabis.    Infectious Disease:  - neg infectious disease ROS     Malignancy:  - neg  "malignancy ROS     Other:  - neg other ROS          /83 (BP Location: Right arm, Patient Position: Chair, Cuff Size: Adult Regular)   Pulse 71   Temp 98  F (36.7  C) (Oral)   Resp 20   Ht 1.753 m (5' 9\")   Wt 89.7 kg (197 lb 11.2 oz)   SpO2 95%   BMI 29.20 kg/m      Physical Exam   Constitutional: Awake, alert, cooperative, no apparent distress, and appears stated age.  Eyes: Pupils equal, round and reactive to light, extra ocular muscles intact, sclera clear, conjunctiva normal.  HENT: Normocephalic, oral pharynx with moist mucus membranes, good dentition. No goiter appreciated.   Respiratory: Clear to auscultation bilaterally, no crackles or wheezing.  Cardiovascular: Regular rate and rhythm, normal S1 and S2, and no murmur noted.  Carotids +2, no bruits. No edema. Palpable pulses to radial  DP and PT arteries.   GI: Normal bowel sounds, soft, non-distended, non-tender, no masses palpated, no hepatosplenomegaly.    Lymph/Hematologic: No cervical lymphadenopathy and no supraclavicular lymphadenopathy.  Genitourinary:  defer  Skin: Warm and dry.  No rashes at anticipated surgical site.   Musculoskeletal: Full ROM of neck. There is no redness, warmth, or swelling of the joints. Gross motor strength is normal.    Neurologic: Awake, alert, oriented to name, place and time. Cranial nerves II-XII are grossly intact. Gait is normal.   Neuropsychiatric: Calm, cooperative. Normal affect.     Prior Labs/Diagnostic Studies   All labs and imaging personally reviewed     EKG/ stress test - if available please see in ROS above     The patient's records and results personally reviewed by this provider.     Outside records reviewed from: Care Everywhere    LAB/DIAGNOSTIC STUDIES TODAY:     Latest Reference Range & Units 07/22/22 11:58   Sodium 133 - 144 mmol/L 141   Potassium 3.4 - 5.3 mmol/L 4.4   Chloride 94 - 109 mmol/L 107   Carbon Dioxide 20 - 32 mmol/L 27   Urea Nitrogen 7 - 30 mg/dL 10   Creatinine 0.66 - 1.25 " "mg/dL 0.80   GFR Estimate >60 mL/min/1.73m2 >90   Calcium 8.5 - 10.1 mg/dL 9.4   Anion Gap 3 - 14 mmol/L 7   Glucose 70 - 99 mg/dL 102 (H)   WBC 4.0 - 11.0 10e3/uL 6.7   Hemoglobin 13.3 - 17.7 g/dL 17.2   Hematocrit 40.0 - 53.0 % 50.2   Platelet Count 150 - 450 10e3/uL 300   RBC Count 4.40 - 5.90 10e6/uL 5.93 (H)   MCV 78 - 100 fL 85   MCH 26.5 - 33.0 pg 29.0   MCHC 31.5 - 36.5 g/dL 34.3   RDW 10.0 - 15.0 % 12.5     Assessment      Rishi Noguera is a 22 year old male seen as a PAC referral for risk assessment and optimization for anesthesia.    Plan/Recommendations  Pt will be optimized for the proposed procedure.  See below for details on the assessment, risk, and preoperative recommendations    NEUROLOGY  - headaches - the patient takes PRN tylenol  -Post Op delirium risk factors:  No risk identified    ENT  - No current airway concerns.  Will need to be reassessed day of surgery.  Mallampati: I  TM: > 3    CARDIAC  - No history of CAD, Hypertension and Afib  - METS (Metabolic Equivalents)  Patient performs 4 or more METS exercise without symptoms            Total Score: 0      RCRI-Very low risk: Class 1 0.4% complication rate            Total Score: 0        PULMONARY  - Obstructive Sleep Apnea  No current risk of obstructive sleep apnea   QUIN Low Risk            Total Score: 1    QUIN: Male      - Asthma  Well controlled - the patient does report he's been having more mucous but denies URI symptoms. Lungs are clear on exam.   - Tobacco History      History   Smoking Status     Never Smoker   Smokeless Tobacco     Never Used       GI  PONV Medium Risk  Total Score: 2           1 AN PONV: Patient is not a current smoker    1 AN PONV: Intended Post Op Opioids        /RENAL  - Baseline Creatinine  1.00    ENDOCRINE    - BMI: Estimated body mass index is 29.2 kg/m  as calculated from the following:    Height as of this encounter: 1.753 m (5' 9\").    Weight as of this encounter: 89.7 kg (197 lb 11.2 oz).  Overweight " (BMI 25.0-29.9)  - No history of Diabetes Mellitus    HEME  VTE Low Risk 0.5%            Total Score: 2    VTE: Male      - No history of abnormal bleeding or antiplatelet use.    MSK  ~ Lumbosacral disc herniation - procedure as above.     OTHER  - The patient will not smoke marijuana for 24 hours prior    The patient is optimized for their procedure. AVS with information on surgery time/arrival time, meds and NPO status given by nursing staff. No further diagnostic testing indicated.      On the day of service:     Prep time: 4 minutes  Visit time: 9 minutes  Documentation time: 8 minutes  ------------------------------------------  Total time: 21 minutes      Brianna Iglesias PA-C  Preoperative Assessment Center  [facility]  Clinic [facility] Surgery Center  Phone: [phone]  Fax: [phone]

## 2022-08-12 ENCOUNTER — ANESTHESIA (OUTPATIENT)
Dept: SURGERY | Facility: CLINIC | Age: 22
End: 2022-08-12
Payer: COMMERCIAL

## 2022-08-12 ENCOUNTER — HOSPITAL ENCOUNTER (INPATIENT)
Facility: CLINIC | Age: 22
LOS: 2 days | Discharge: HOME | End: 2022-08-14
Attending: ORTHOPAEDIC SURGERY | Admitting: ORTHOPAEDIC SURGERY
Payer: COMMERCIAL

## 2022-08-12 ENCOUNTER — APPOINTMENT (OUTPATIENT)
Dept: GENERAL RADIOLOGY | Facility: CLINIC | Age: 22
End: 2022-08-12
Attending: ORTHOPAEDIC SURGERY
Payer: COMMERCIAL

## 2022-08-12 ENCOUNTER — ANESTHESIA EVENT (OUTPATIENT)
Dept: SURGERY | Facility: CLINIC | Age: 22
End: 2022-08-12
Payer: COMMERCIAL

## 2022-08-12 DIAGNOSIS — Z98.890 STATUS POST LUMBAR SPINE SURGERY FOR DECOMPRESSION OF SPINAL CORD: Primary | ICD-10-CM

## 2022-08-12 LAB — GLUCOSE BLDC GLUCOMTR-MCNC: 113 MG/DL (ref 70–99)

## 2022-08-12 PROCEDURE — 00UT0KZ SUPPLEMENT SPINAL MENINGES WITH NONAUTOLOGOUS TISSUE SUBSTITUTE, OPEN APPROACH: ICD-10-PCS | Performed by: NEUROLOGICAL SURGERY

## 2022-08-12 PROCEDURE — 63030 LAMOT DCMPRN NRV RT 1 LMBR: CPT | Mod: RT | Performed by: ORTHOPAEDIC SURGERY

## 2022-08-12 PROCEDURE — 710N000010 HC RECOVERY PHASE 1, LEVEL 2, PER MIN: Performed by: ORTHOPAEDIC SURGERY

## 2022-08-12 PROCEDURE — 999N000141 HC STATISTIC PRE-PROCEDURE NURSING ASSESSMENT: Performed by: ORTHOPAEDIC SURGERY

## 2022-08-12 PROCEDURE — 99221 1ST HOSP IP/OBS SF/LOW 40: CPT | Performed by: INTERNAL MEDICINE

## 2022-08-12 PROCEDURE — 370N000017 HC ANESTHESIA TECHNICAL FEE, PER MIN: Performed by: ORTHOPAEDIC SURGERY

## 2022-08-12 PROCEDURE — 999N000180 XR SURGERY CARM FLUORO LESS THAN 5 MIN: Mod: TC

## 2022-08-12 PROCEDURE — 250N000011 HC RX IP 250 OP 636: Performed by: PHYSICIAN ASSISTANT

## 2022-08-12 PROCEDURE — 272N000004 HC RX 272: Performed by: ORTHOPAEDIC SURGERY

## 2022-08-12 PROCEDURE — 250N000011 HC RX IP 250 OP 636: Performed by: STUDENT IN AN ORGANIZED HEALTH CARE EDUCATION/TRAINING PROGRAM

## 2022-08-12 PROCEDURE — 250N000025 HC SEVOFLURANE, PER MIN: Performed by: ORTHOPAEDIC SURGERY

## 2022-08-12 PROCEDURE — 250N000009 HC RX 250: Performed by: NURSE ANESTHETIST, CERTIFIED REGISTERED

## 2022-08-12 PROCEDURE — 250N000013 HC RX MED GY IP 250 OP 250 PS 637: Performed by: STUDENT IN AN ORGANIZED HEALTH CARE EDUCATION/TRAINING PROGRAM

## 2022-08-12 PROCEDURE — 272N000001 HC OR GENERAL SUPPLY STERILE: Performed by: ORTHOPAEDIC SURGERY

## 2022-08-12 PROCEDURE — 82962 GLUCOSE BLOOD TEST: CPT

## 2022-08-12 PROCEDURE — 250N000011 HC RX IP 250 OP 636: Performed by: NURSE ANESTHETIST, CERTIFIED REGISTERED

## 2022-08-12 PROCEDURE — 360N000084 HC SURGERY LEVEL 4 W/ FLUORO, PER MIN: Performed by: ORTHOPAEDIC SURGERY

## 2022-08-12 PROCEDURE — 01QB3ZZ REPAIR LUMBAR NERVE, PERCUTANEOUS APPROACH: ICD-10-PCS | Performed by: NEUROLOGICAL SURGERY

## 2022-08-12 PROCEDURE — C1763 CONN TISS, NON-HUMAN: HCPCS | Performed by: ORTHOPAEDIC SURGERY

## 2022-08-12 PROCEDURE — 120N000002 HC R&B MED SURG/OB UMMC

## 2022-08-12 PROCEDURE — 0SB43ZZ EXCISION OF LUMBOSACRAL DISC, PERCUTANEOUS APPROACH: ICD-10-PCS | Performed by: ORTHOPAEDIC SURGERY

## 2022-08-12 PROCEDURE — 258N000003 HC RX IP 258 OP 636: Performed by: NURSE ANESTHETIST, CERTIFIED REGISTERED

## 2022-08-12 PROCEDURE — 250N000013 HC RX MED GY IP 250 OP 250 PS 637: Performed by: PHYSICIAN ASSISTANT

## 2022-08-12 DEVICE — IMPLANTABLE DEVICE: Type: IMPLANTABLE DEVICE | Site: SPINE LUMBAR | Status: FUNCTIONAL

## 2022-08-12 RX ORDER — CEFAZOLIN SODIUM/WATER 2 G/20 ML
2 SYRINGE (ML) INTRAVENOUS SEE ADMIN INSTRUCTIONS
Status: DISCONTINUED | OUTPATIENT
Start: 2022-08-12 | End: 2022-08-12 | Stop reason: HOSPADM

## 2022-08-12 RX ORDER — ONDANSETRON 2 MG/ML
INJECTION INTRAMUSCULAR; INTRAVENOUS PRN
Status: DISCONTINUED | OUTPATIENT
Start: 2022-08-12 | End: 2022-08-12

## 2022-08-12 RX ORDER — ACETAMINOPHEN 325 MG/1
650 TABLET ORAL
Status: DISCONTINUED | OUTPATIENT
Start: 2022-08-12 | End: 2022-08-12

## 2022-08-12 RX ORDER — LIDOCAINE 40 MG/G
CREAM TOPICAL
Status: DISCONTINUED | OUTPATIENT
Start: 2022-08-12 | End: 2022-08-14 | Stop reason: HOSPADM

## 2022-08-12 RX ORDER — OXYCODONE HYDROCHLORIDE 5 MG/1
5 TABLET ORAL EVERY 4 HOURS PRN
Qty: 20 TABLET | Refills: 0 | Status: SHIPPED | OUTPATIENT
Start: 2022-08-12

## 2022-08-12 RX ORDER — NALOXONE HYDROCHLORIDE 0.4 MG/ML
0.4 INJECTION, SOLUTION INTRAMUSCULAR; INTRAVENOUS; SUBCUTANEOUS
Status: DISCONTINUED | OUTPATIENT
Start: 2022-08-12 | End: 2022-08-14 | Stop reason: HOSPADM

## 2022-08-12 RX ORDER — HYDROXYZINE HYDROCHLORIDE 25 MG/1
25 TABLET, FILM COATED ORAL EVERY 6 HOURS PRN
Status: DISCONTINUED | OUTPATIENT
Start: 2022-08-12 | End: 2022-08-14 | Stop reason: HOSPADM

## 2022-08-12 RX ORDER — LIDOCAINE HYDROCHLORIDE 20 MG/ML
INJECTION, SOLUTION INFILTRATION; PERINEURAL PRN
Status: DISCONTINUED | OUTPATIENT
Start: 2022-08-12 | End: 2022-08-12

## 2022-08-12 RX ORDER — ALBUTEROL SULFATE 90 UG/1
2 AEROSOL, METERED RESPIRATORY (INHALATION) EVERY 4 HOURS PRN
Status: DISCONTINUED | OUTPATIENT
Start: 2022-08-12 | End: 2022-08-14 | Stop reason: HOSPADM

## 2022-08-12 RX ORDER — CEFAZOLIN SODIUM 2 G/100ML
2 INJECTION, SOLUTION INTRAVENOUS EVERY 8 HOURS
Status: COMPLETED | OUTPATIENT
Start: 2022-08-12 | End: 2022-08-13

## 2022-08-12 RX ORDER — FENTANYL CITRATE 50 UG/ML
25 INJECTION, SOLUTION INTRAMUSCULAR; INTRAVENOUS
Status: DISCONTINUED | OUTPATIENT
Start: 2022-08-12 | End: 2022-08-12 | Stop reason: HOSPADM

## 2022-08-12 RX ORDER — NALOXONE HYDROCHLORIDE 0.4 MG/ML
0.4 INJECTION, SOLUTION INTRAMUSCULAR; INTRAVENOUS; SUBCUTANEOUS
Status: DISCONTINUED | OUTPATIENT
Start: 2022-08-12 | End: 2022-08-12 | Stop reason: HOSPADM

## 2022-08-12 RX ORDER — METHOCARBAMOL 750 MG/1
750 TABLET, FILM COATED ORAL
Status: DISCONTINUED | OUTPATIENT
Start: 2022-08-12 | End: 2022-08-12

## 2022-08-12 RX ORDER — OXYCODONE HYDROCHLORIDE 5 MG/1
5 TABLET ORAL EVERY 4 HOURS PRN
Status: DISCONTINUED | OUTPATIENT
Start: 2022-08-12 | End: 2022-08-14 | Stop reason: HOSPADM

## 2022-08-12 RX ORDER — POLYETHYLENE GLYCOL 3350 17 G/17G
17 POWDER, FOR SOLUTION ORAL DAILY
Status: DISCONTINUED | OUTPATIENT
Start: 2022-08-13 | End: 2022-08-14 | Stop reason: HOSPADM

## 2022-08-12 RX ORDER — ACETAMINOPHEN 325 MG/1
650 TABLET ORAL EVERY 4 HOURS PRN
Status: DISCONTINUED | OUTPATIENT
Start: 2022-08-15 | End: 2022-08-14 | Stop reason: HOSPADM

## 2022-08-12 RX ORDER — HYDROXYZINE HYDROCHLORIDE 25 MG/1
25 TABLET, FILM COATED ORAL
Status: DISCONTINUED | OUTPATIENT
Start: 2022-08-12 | End: 2022-08-12

## 2022-08-12 RX ORDER — ACETAMINOPHEN 325 MG/1
975 TABLET ORAL EVERY 8 HOURS
Status: DISCONTINUED | OUTPATIENT
Start: 2022-08-12 | End: 2022-08-14 | Stop reason: HOSPADM

## 2022-08-12 RX ORDER — AMOXICILLIN 250 MG
1-2 CAPSULE ORAL 2 TIMES DAILY
Qty: 30 TABLET | Refills: 0 | Status: SHIPPED | OUTPATIENT
Start: 2022-08-12

## 2022-08-12 RX ORDER — FAMOTIDINE 20 MG/1
20 TABLET, FILM COATED ORAL 2 TIMES DAILY
Status: DISCONTINUED | OUTPATIENT
Start: 2022-08-12 | End: 2022-08-14 | Stop reason: HOSPADM

## 2022-08-12 RX ORDER — ONDANSETRON 2 MG/ML
4 INJECTION INTRAMUSCULAR; INTRAVENOUS EVERY 6 HOURS PRN
Status: DISCONTINUED | OUTPATIENT
Start: 2022-08-12 | End: 2022-08-14 | Stop reason: HOSPADM

## 2022-08-12 RX ORDER — GABAPENTIN 100 MG/1
300 CAPSULE ORAL
Status: COMPLETED | OUTPATIENT
Start: 2022-08-12 | End: 2022-08-12

## 2022-08-12 RX ORDER — ONDANSETRON 4 MG/1
4 TABLET, ORALLY DISINTEGRATING ORAL EVERY 6 HOURS PRN
Status: DISCONTINUED | OUTPATIENT
Start: 2022-08-12 | End: 2022-08-14 | Stop reason: HOSPADM

## 2022-08-12 RX ORDER — MEPERIDINE HYDROCHLORIDE 25 MG/ML
12.5 INJECTION INTRAMUSCULAR; INTRAVENOUS; SUBCUTANEOUS
Status: DISCONTINUED | OUTPATIENT
Start: 2022-08-12 | End: 2022-08-12 | Stop reason: HOSPADM

## 2022-08-12 RX ORDER — PROPOFOL 10 MG/ML
INJECTION, EMULSION INTRAVENOUS PRN
Status: DISCONTINUED | OUTPATIENT
Start: 2022-08-12 | End: 2022-08-12

## 2022-08-12 RX ORDER — NALOXONE HYDROCHLORIDE 0.4 MG/ML
0.2 INJECTION, SOLUTION INTRAMUSCULAR; INTRAVENOUS; SUBCUTANEOUS
Status: DISCONTINUED | OUTPATIENT
Start: 2022-08-12 | End: 2022-08-12 | Stop reason: HOSPADM

## 2022-08-12 RX ORDER — TRIAMCINOLONE ACETONIDE 1 MG/G
CREAM TOPICAL 2 TIMES DAILY PRN
COMMUNITY

## 2022-08-12 RX ORDER — OXYCODONE HYDROCHLORIDE 5 MG/1
5 TABLET ORAL
Status: DISCONTINUED | OUTPATIENT
Start: 2022-08-12 | End: 2022-08-12

## 2022-08-12 RX ORDER — GABAPENTIN 100 MG/1
100 CAPSULE ORAL 3 TIMES DAILY
Status: DISCONTINUED | OUTPATIENT
Start: 2022-08-12 | End: 2022-08-13

## 2022-08-12 RX ORDER — PROCHLORPERAZINE MALEATE 10 MG
10 TABLET ORAL EVERY 6 HOURS PRN
Status: DISCONTINUED | OUTPATIENT
Start: 2022-08-12 | End: 2022-08-14 | Stop reason: HOSPADM

## 2022-08-12 RX ORDER — OXYCODONE HYDROCHLORIDE 10 MG/1
10 TABLET ORAL EVERY 4 HOURS PRN
Status: DISCONTINUED | OUTPATIENT
Start: 2022-08-12 | End: 2022-08-14 | Stop reason: HOSPADM

## 2022-08-12 RX ORDER — ACETAMINOPHEN 325 MG/1
975 TABLET ORAL ONCE
Status: COMPLETED | OUTPATIENT
Start: 2022-08-12 | End: 2022-08-12

## 2022-08-12 RX ORDER — ONDANSETRON 4 MG/1
4 TABLET, ORALLY DISINTEGRATING ORAL EVERY 30 MIN PRN
Status: DISCONTINUED | OUTPATIENT
Start: 2022-08-12 | End: 2022-08-12 | Stop reason: HOSPADM

## 2022-08-12 RX ORDER — NALOXONE HYDROCHLORIDE 0.4 MG/ML
0.2 INJECTION, SOLUTION INTRAMUSCULAR; INTRAVENOUS; SUBCUTANEOUS
Status: DISCONTINUED | OUTPATIENT
Start: 2022-08-12 | End: 2022-08-14 | Stop reason: HOSPADM

## 2022-08-12 RX ORDER — SODIUM CHLORIDE 9 MG/ML
INJECTION, SOLUTION INTRAVENOUS CONTINUOUS
Status: DISCONTINUED | OUTPATIENT
Start: 2022-08-12 | End: 2022-08-12

## 2022-08-12 RX ORDER — CEFAZOLIN SODIUM/WATER 2 G/20 ML
2 SYRINGE (ML) INTRAVENOUS
Status: COMPLETED | OUTPATIENT
Start: 2022-08-12 | End: 2022-08-12

## 2022-08-12 RX ORDER — SODIUM CHLORIDE, SODIUM LACTATE, POTASSIUM CHLORIDE, CALCIUM CHLORIDE 600; 310; 30; 20 MG/100ML; MG/100ML; MG/100ML; MG/100ML
INJECTION, SOLUTION INTRAVENOUS CONTINUOUS
Status: DISCONTINUED | OUTPATIENT
Start: 2022-08-12 | End: 2022-08-12 | Stop reason: HOSPADM

## 2022-08-12 RX ORDER — FENTANYL CITRATE 50 UG/ML
25 INJECTION, SOLUTION INTRAMUSCULAR; INTRAVENOUS EVERY 5 MIN PRN
Status: DISCONTINUED | OUTPATIENT
Start: 2022-08-12 | End: 2022-08-12

## 2022-08-12 RX ORDER — ACETAMINOPHEN 325 MG/1
650 TABLET ORAL EVERY 4 HOURS PRN
Qty: 60 TABLET | Refills: 1 | Status: SHIPPED | OUTPATIENT
Start: 2022-08-12

## 2022-08-12 RX ORDER — METHOCARBAMOL 750 MG/1
750 TABLET, FILM COATED ORAL EVERY 6 HOURS PRN
Status: DISCONTINUED | OUTPATIENT
Start: 2022-08-12 | End: 2022-08-14 | Stop reason: HOSPADM

## 2022-08-12 RX ORDER — ONDANSETRON 4 MG/1
4 TABLET, ORALLY DISINTEGRATING ORAL
Status: DISCONTINUED | OUTPATIENT
Start: 2022-08-12 | End: 2022-08-12

## 2022-08-12 RX ORDER — AMOXICILLIN 250 MG
1 CAPSULE ORAL 2 TIMES DAILY
Status: DISCONTINUED | OUTPATIENT
Start: 2022-08-12 | End: 2022-08-13

## 2022-08-12 RX ORDER — FENTANYL CITRATE 50 UG/ML
INJECTION, SOLUTION INTRAMUSCULAR; INTRAVENOUS PRN
Status: DISCONTINUED | OUTPATIENT
Start: 2022-08-12 | End: 2022-08-12

## 2022-08-12 RX ORDER — BISACODYL 10 MG
10 SUPPOSITORY, RECTAL RECTAL DAILY PRN
Status: DISCONTINUED | OUTPATIENT
Start: 2022-08-12 | End: 2022-08-14 | Stop reason: HOSPADM

## 2022-08-12 RX ORDER — SODIUM CHLORIDE, SODIUM LACTATE, POTASSIUM CHLORIDE, CALCIUM CHLORIDE 600; 310; 30; 20 MG/100ML; MG/100ML; MG/100ML; MG/100ML
INJECTION, SOLUTION INTRAVENOUS CONTINUOUS PRN
Status: DISCONTINUED | OUTPATIENT
Start: 2022-08-12 | End: 2022-08-12

## 2022-08-12 RX ORDER — ONDANSETRON 2 MG/ML
4 INJECTION INTRAMUSCULAR; INTRAVENOUS EVERY 30 MIN PRN
Status: DISCONTINUED | OUTPATIENT
Start: 2022-08-12 | End: 2022-08-12 | Stop reason: HOSPADM

## 2022-08-12 RX ORDER — DEXAMETHASONE SODIUM PHOSPHATE 4 MG/ML
INJECTION, SOLUTION INTRA-ARTICULAR; INTRALESIONAL; INTRAMUSCULAR; INTRAVENOUS; SOFT TISSUE PRN
Status: DISCONTINUED | OUTPATIENT
Start: 2022-08-12 | End: 2022-08-12

## 2022-08-12 RX ORDER — OXYCODONE HYDROCHLORIDE 5 MG/1
5 TABLET ORAL EVERY 4 HOURS PRN
Status: DISCONTINUED | OUTPATIENT
Start: 2022-08-12 | End: 2022-08-12 | Stop reason: HOSPADM

## 2022-08-12 RX ORDER — ONDANSETRON 4 MG/1
4 TABLET, ORALLY DISINTEGRATING ORAL EVERY 8 HOURS PRN
Qty: 4 TABLET | Refills: 0 | Status: SHIPPED | OUTPATIENT
Start: 2022-08-12

## 2022-08-12 RX ORDER — HYDROMORPHONE HYDROCHLORIDE 1 MG/ML
0.2 INJECTION, SOLUTION INTRAMUSCULAR; INTRAVENOUS; SUBCUTANEOUS EVERY 5 MIN PRN
Status: DISCONTINUED | OUTPATIENT
Start: 2022-08-12 | End: 2022-08-12

## 2022-08-12 RX ADMIN — Medication 20 MG: at 10:46

## 2022-08-12 RX ADMIN — Medication 2 G: at 10:07

## 2022-08-12 RX ADMIN — LIDOCAINE HYDROCHLORIDE 100 MG: 20 INJECTION, SOLUTION INFILTRATION; PERINEURAL at 10:09

## 2022-08-12 RX ADMIN — ACETAMINOPHEN 975 MG: 325 TABLET ORAL at 16:08

## 2022-08-12 RX ADMIN — PHENYLEPHRINE HYDROCHLORIDE 100 MCG: 10 INJECTION INTRAVENOUS at 10:12

## 2022-08-12 RX ADMIN — FENTANYL CITRATE 50 MCG: 50 INJECTION, SOLUTION INTRAMUSCULAR; INTRAVENOUS at 11:09

## 2022-08-12 RX ADMIN — GABAPENTIN 300 MG: 300 CAPSULE ORAL at 08:40

## 2022-08-12 RX ADMIN — METHOCARBAMOL 750 MG: 750 TABLET ORAL at 20:35

## 2022-08-12 RX ADMIN — DEXAMETHASONE SODIUM PHOSPHATE 6 MG: 4 INJECTION, SOLUTION INTRAMUSCULAR; INTRAVENOUS at 10:27

## 2022-08-12 RX ADMIN — OXYCODONE HYDROCHLORIDE 5 MG: 5 TABLET ORAL at 13:50

## 2022-08-12 RX ADMIN — Medication 5 MG: at 11:35

## 2022-08-12 RX ADMIN — HYDROMORPHONE HYDROCHLORIDE 0.2 MG: 1 INJECTION, SOLUTION INTRAMUSCULAR; INTRAVENOUS; SUBCUTANEOUS at 13:30

## 2022-08-12 RX ADMIN — ACETAMINOPHEN 975 MG: 325 TABLET ORAL at 08:40

## 2022-08-12 RX ADMIN — Medication 50 MG: at 10:13

## 2022-08-12 RX ADMIN — SODIUM CHLORIDE, POTASSIUM CHLORIDE, SODIUM LACTATE AND CALCIUM CHLORIDE: 600; 310; 30; 20 INJECTION, SOLUTION INTRAVENOUS at 10:02

## 2022-08-12 RX ADMIN — GABAPENTIN 100 MG: 100 CAPSULE ORAL at 20:20

## 2022-08-12 RX ADMIN — GABAPENTIN 100 MG: 100 CAPSULE ORAL at 14:08

## 2022-08-12 RX ADMIN — ONDANSETRON 4 MG: 2 INJECTION INTRAMUSCULAR; INTRAVENOUS at 11:19

## 2022-08-12 RX ADMIN — HYDROMORPHONE HYDROCHLORIDE 0.2 MG: 1 INJECTION, SOLUTION INTRAMUSCULAR; INTRAVENOUS; SUBCUTANEOUS at 14:09

## 2022-08-12 RX ADMIN — MIDAZOLAM 2 MG: 1 INJECTION INTRAMUSCULAR; INTRAVENOUS at 09:59

## 2022-08-12 RX ADMIN — HYDROMORPHONE HYDROCHLORIDE 0.25 MG: 1 INJECTION, SOLUTION INTRAMUSCULAR; INTRAVENOUS; SUBCUTANEOUS at 10:33

## 2022-08-12 RX ADMIN — FENTANYL CITRATE 50 MCG: 50 INJECTION, SOLUTION INTRAMUSCULAR; INTRAVENOUS at 11:18

## 2022-08-12 RX ADMIN — PROPOFOL 200 MG: 10 INJECTION, EMULSION INTRAVENOUS at 10:12

## 2022-08-12 RX ADMIN — FENTANYL CITRATE 100 MCG: 50 INJECTION, SOLUTION INTRAMUSCULAR; INTRAVENOUS at 10:09

## 2022-08-12 RX ADMIN — ACETAMINOPHEN 975 MG: 325 TABLET ORAL at 20:19

## 2022-08-12 RX ADMIN — OXYCODONE HYDROCHLORIDE 5 MG: 5 TABLET ORAL at 17:59

## 2022-08-12 RX ADMIN — SENNOSIDES AND DOCUSATE SODIUM 1 TABLET: 50; 8.6 TABLET ORAL at 20:20

## 2022-08-12 RX ADMIN — SUGAMMADEX 200 MG: 100 INJECTION, SOLUTION INTRAVENOUS at 12:12

## 2022-08-12 RX ADMIN — FAMOTIDINE 20 MG: 20 TABLET ORAL at 20:20

## 2022-08-12 RX ADMIN — Medication 5 MG: at 11:27

## 2022-08-12 RX ADMIN — HYDROMORPHONE HYDROCHLORIDE 0.25 MG: 1 INJECTION, SOLUTION INTRAMUSCULAR; INTRAVENOUS; SUBCUTANEOUS at 10:56

## 2022-08-12 RX ADMIN — HYDROXYZINE HYDROCHLORIDE 25 MG: 25 TABLET ORAL at 13:50

## 2022-08-12 RX ADMIN — HYDROMORPHONE HYDROCHLORIDE 0.2 MG: 1 INJECTION, SOLUTION INTRAMUSCULAR; INTRAVENOUS; SUBCUTANEOUS at 13:16

## 2022-08-12 RX ADMIN — CEFAZOLIN SODIUM 2 G: 2 INJECTION, SOLUTION INTRAVENOUS at 19:21

## 2022-08-12 RX ADMIN — OXYCODONE HYDROCHLORIDE 10 MG: 10 TABLET ORAL at 22:44

## 2022-08-12 ASSESSMENT — ACTIVITIES OF DAILY LIVING (ADL)
ADLS_ACUITY_SCORE: 35
ADLS_ACUITY_SCORE: 35
ADLS_ACUITY_SCORE: 37
ADLS_ACUITY_SCORE: 37
ADLS_ACUITY_SCORE: 35
ADLS_ACUITY_SCORE: 37
ADLS_ACUITY_SCORE: 35
ADLS_ACUITY_SCORE: 35

## 2022-08-12 NOTE — PLAN OF CARE
"VS:     /62 (BP Location: Right arm)   Pulse 92   Temp 98.9  F (37.2  C) (Oral)   Resp 16   Ht 1.753 m (5' 9\")   Wt 91 kg (200 lb 9.9 oz)   SpO2 95%   BMI 29.63 kg/m      Pt A/O X 4. Afebrile. VSS. Lungs- clear bilaterally  IS encouraged. Denies nausea, shortness of breath, and chest pain.     Output:       Bowels- active in all four quadrants. Torres in place.     Activity:     No activity. Pt is to remain completely flat on back for 24 hrs.      Skin: Intact    Pain:       Has pain in the back and given meds.     CMS:       CMS and Neuro's are intact. Pt states numbness in right leg.      Dressing:       UTV incisional dressing on lumbar     Diet:       Pt is on a regdiet and appetite was adequate this shift.       LDA:       PIV is patent in the R and L arm      Equipment:     Capno    PCD's on BLE's.      Plan:       Pt is able to make needs known and the call light is within the pt's reach. Continue to monitor.                  "

## 2022-08-12 NOTE — ANESTHESIA PREPROCEDURE EVALUATION
Pre-Operative H & P     CC:  Preoperative exam to assess for increased cardiopulmonary risk while undergoing surgery and anesthesia.    Date of Encounter: 7/22/2022  Primary Care Physician:  No primary care provider on file.     Reason for visit:   No diagnosis found.    MARYANN Noguera is a 22 year old male who presents for pre-operative H & P in preparation for  Procedure Information     Date/Time: 8/12/22     Procedure: Minimally Invasive Right Lumbar 5 to Sacral 1 Microdiscectomy with Microscope and METRX tubes    Anesthesia type: General     Pre-op diagnosis: Lumbosacral disc herniation    Location: [facility]    Providers: Dr. Mabry           The patient is a 22-year-old man with past medical history significant for asthma, headaches and marijuana use.  He has been having ongoing back pain that goes down his leg after working out.  He has tried physical therapy without improvement in his symptoms.  He was seen by Dr. Mabry on 7/11/2022 to discuss surgical treatment options.  The patient is now scheduled for the procedure as above.    History is obtained from the patient and chart review    Hx of abnormal bleeding or anti-platelet use: none      Past Medical History  Past Medical History:   Diagnosis Date     Asthma      Headache      Lumbar disc herniation        Past Surgical History  Past Surgical History:   Procedure Laterality Date     wisdom teeth extraction         Prior to Admission Medications  No current outpatient medications on file.       Allergies  No Known Allergies    Social History  Social History     Socioeconomic History     Marital status: Single     Spouse name: Not on file     Number of children: Not on file     Years of education: Not on file     Highest education level: Not on file   Occupational History     Not on file   Tobacco Use     Smoking status: Never Smoker     Smokeless tobacco: Never Used   Substance and Sexual Activity      Alcohol use: Not on file     Drug use: Yes     Types: Marijuana     Sexual activity: Not on file   Other Topics Concern     Not on file   Social History Narrative     Not on file     Social Determinants of Health     Financial Resource Strain: Not on file   Food Insecurity: Not on file   Transportation Needs: Not on file   Physical Activity: Not on file   Stress: Not on file   Social Connections: Not on file   Intimate Partner Violence: Not on file   Housing Stability: Not on file       Family History  Family History   Problem Relation Age of Onset     Anesthesia Reaction No family hx of      Deep Vein Thrombosis (DVT) No family hx of        Review of Systems  The complete review of systems is negative other than noted in the HPI or here.   Anesthesia Evaluation   Pt has had prior anesthetic. Type of anesthetic: wisdom teeth.    No history of anesthetic complications       ROS/MED HX  ENT/Pulmonary:     (+) Mild Persistent, asthma Treatment: Inhaler prn, Oral steroids and Inhaler daily,      Neurologic: Comment: Headache        Cardiovascular:     (+) -----Previous cardiac testing   Echo: Date: Results:    Stress Test: Date: Results:    ECG Reviewed: Date: 5/22/21 Results:  NSR    Cath: Date: Results:      METS/Exercise Tolerance: 4 - Raking leaves, gardening    Hematologic:  - neg hematologic  ROS     Musculoskeletal: Comment: Lumbosacral disc herniation      GI/Hepatic:  - neg GI/hepatic ROS     Renal/Genitourinary:  - neg Renal ROS     Endo:  - neg endo ROS     Psychiatric/Substance Use:  - neg psychiatric ROS   (+) Recreational drug usage: Cannabis.    Infectious Disease:  - neg infectious disease ROS     Malignancy:  - neg malignancy ROS     Other:  - neg other ROS          There were no vitals taken for this visit.    Physical Exam   Constitutional: Awake, alert, cooperative, no apparent distress, and appears stated age.  Eyes: Pupils equal, round and reactive to light, extra ocular muscles intact, sclera  clear, conjunctiva normal.  HENT: Normocephalic, oral pharynx with moist mucus membranes, good dentition. No goiter appreciated.   Respiratory: Clear to auscultation bilaterally, no crackles or wheezing.  Cardiovascular: Regular rate and rhythm, normal S1 and S2, and no murmur noted.  Carotids +2, no bruits. No edema. Palpable pulses to radial  DP and PT arteries.   GI: Normal bowel sounds, soft, non-distended, non-tender, no masses palpated, no hepatosplenomegaly.    Lymph/Hematologic: No cervical lymphadenopathy and no supraclavicular lymphadenopathy.  Genitourinary:  defer  Skin: Warm and dry.  No rashes at anticipated surgical site.   Musculoskeletal: Full ROM of neck. There is no redness, warmth, or swelling of the joints. Gross motor strength is normal.    Neurologic: Awake, alert, oriented to name, place and time. Cranial nerves II-XII are grossly intact. Gait is normal.   Neuropsychiatric: Calm, cooperative. Normal affect.     Prior Labs/Diagnostic Studies   All labs and imaging personally reviewed     EKG/ stress test - if available please see in ROS above     The patient's records and results personally reviewed by this provider.     Outside records reviewed from: Care Everywhere    LAB/DIAGNOSTIC STUDIES TODAY:     Latest Reference Range & Units 07/22/22 11:58   Sodium 133 - 144 mmol/L 141   Potassium 3.4 - 5.3 mmol/L 4.4   Chloride 94 - 109 mmol/L 107   Carbon Dioxide 20 - 32 mmol/L 27   Urea Nitrogen 7 - 30 mg/dL 10   Creatinine 0.66 - 1.25 mg/dL 0.80   GFR Estimate >60 mL/min/1.73m2 >90   Calcium 8.5 - 10.1 mg/dL 9.4   Anion Gap 3 - 14 mmol/L 7   Glucose 70 - 99 mg/dL 102 (H)   WBC 4.0 - 11.0 10e3/uL 6.7   Hemoglobin 13.3 - 17.7 g/dL 17.2   Hematocrit 40.0 - 53.0 % 50.2   Platelet Count 150 - 450 10e3/uL 300   RBC Count 4.40 - 5.90 10e6/uL 5.93 (H)   MCV 78 - 100 fL 85   MCH 26.5 - 33.0 pg 29.0   MCHC 31.5 - 36.5 g/dL 34.3   RDW 10.0 - 15.0 % 12.5     Assessment      Rishi Noguera is a 22 year old male  "seen as a PAC referral for risk assessment and optimization for anesthesia.    Plan/Recommendations  Pt will be optimized for the proposed procedure.  See below for details on the assessment, risk, and preoperative recommendations    NEUROLOGY  - headaches - the patient takes PRN tylenol  -Post Op delirium risk factors:  No risk identified    ENT  - No current airway concerns.  Will need to be reassessed day of surgery.  Mallampati: I  TM: > 3    CARDIAC  - No history of CAD, Hypertension and Afib  - METS (Metabolic Equivalents)  Patient performs 4 or more METS exercise without symptoms            Total Score: 0      RCRI-Very low risk: Class 1 0.4% complication rate            Total Score: 0        PULMONARY  - Obstructive Sleep Apnea  No current risk of obstructive sleep apnea   QUIN Low Risk            Total Score: 1    QUIN: Male      - Asthma  Well controlled - the patient does report he's been having more mucous but denies URI symptoms. Lungs are clear on exam.   - Tobacco History      History   Smoking Status     Never Smoker   Smokeless Tobacco     Never Used       GI  PONV Medium Risk  Total Score: 2           1 AN PONV: Patient is not a current smoker    1 AN PONV: Intended Post Op Opioids        /RENAL  - Baseline Creatinine  1.00    ENDOCRINE    - BMI: Estimated body mass index is 29.63 kg/m  as calculated from the following:    Height as of an earlier encounter on 8/12/22: 1.753 m (5' 9\").    Weight as of an earlier encounter on 8/12/22: 91 kg (200 lb 9.9 oz).  Overweight (BMI 25.0-29.9)  - No history of Diabetes Mellitus    HEME  VTE Low Risk 0.5%            Total Score: 2    VTE: Male      - No history of abnormal bleeding or antiplatelet use.    MSK  ~ Lumbosacral disc herniation - procedure as above.     OTHER  - The patient will not smoke marijuana for 24 hours prior    The patient is optimized for their procedure. AVS with information on surgery time/arrival time, meds and NPO status given by " nursing staff. No further diagnostic testing indicated.      On the day of service:     Prep time: 4 minutes  Visit time: 9 minutes  Documentation time: 8 minutes  ------------------------------------------  Total time: 21 minutes      Brianna Iglesias PA-C  Preoperative Assessment Center  [facility]  Phone: [phone]  Fax: [phone]    Physical Exam    Airway        Mallampati: I   TM distance: > 3 FB   Neck ROM: full   Mouth opening: > 3 cm    Respiratory Devices and Support         Dental  no notable dental history         Cardiovascular   cardiovascular exam normal          Pulmonary   pulmonary exam normal                  Anesthesia Plan    ASA Status:  2   NPO Status:  NPO Appropriate    Anesthesia Type: General.     - Airway: ETT   Induction: Intravenous.   Maintenance: Balanced.   Techniques and Equipment:     - Lines/Monitors: 2nd IV     Consents    Anesthesia Plan(s) and associated risks, benefits, and realistic alternatives discussed. Questions answered and patient/representative(s) expressed understanding.     - Discussed: Risks, Benefits and Alternatives for BOTH SEDATION and the PROCEDURE were discussed     - Discussed with:  Patient      - Extended Intubation/Ventilatory Support Discussed: No.      - Patient is DNR/DNI Status: No    Use of blood products discussed: Yes.     - Discussed with: Patient.     Postoperative Care    Pain management: IV analgesics, Multi-modal analgesia.   PONV prophylaxis: Ondansetron (or other 5HT-3), Dexamethasone or Solumedrol     Comments:

## 2022-08-12 NOTE — PHARMACY-ADMISSION MEDICATION HISTORY
Admission Medication History Completed by Pharmacy    See [facility] Admission Navigator for allergy information, preferred outpatient pharmacy, prior to admission medications and immunization status.     Medication History Sources:     Care Everywhere    Patient interview    Changes made to PTA medication list (reason):    Added: triamcinolone 0.1% cream apply topically to affected area as needed twice daily    Deleted: advair diskus 250-50 (patient reported not taking, was told only to take for 1 month), montelukast 10 mg (patient reported not taking, was told only to take for 1 month)    Changed: added directions for albuterol inhaler - 2 puffs every 4 hours as needed    Additional Information:    Does not currently need triamcinolone cream, uses only as needed    Prior to Admission medications    Medication Sig Last Dose Taking? Auth Provider Long Term End Date   albuterol (PROAIR HFA/PROVENTIL HFA/VENTOLIN HFA) 108 (90 Base) MCG/ACT inhaler Inhale 2 puffs into the lungs every 4 hours as needed Past Month at Unknown time Yes Reported, Patient Yes    triamcinolone (KENALOG) 0.1 % external cream Apply topically 2 times daily as needed Past Month at Unknown time Yes Unknown, Entered By History       Date completed: 08/12/22    Medication history completed by:    Karlos Coello, PharmD  PGY1 Pharmacist Resident            
Female

## 2022-08-12 NOTE — ANESTHESIA PROCEDURE NOTES
Airway       Patient location during procedure: OR       Procedure Start/Stop Times: 8/12/2022 10:15 AM  Staff -        CRNA: Anjali Taveras APRN CRNA       Performed By: CRNA  Consent for Airway        Urgency: elective  Indications and Patient Condition       Indications for airway management: gaurav-procedural       Induction type:intravenous       Mask difficulty assessment: 1 - vent by mask    Final Airway Details       Final airway type: endotracheal airway       Successful airway: ETT - single  Endotracheal Airway Details        ETT size (mm): 8.0       Cuffed: yes       Successful intubation technique: direct laryngoscopy       DL Blade Type: Pritchett 2       Grade View of Cords: 1       Adjucts: stylet       Position: Right       Measured from: lips       Secured at (cm): 24       Bite block used: Soft    Post intubation assessment        Placement verified by: capnometry, equal breath sounds and chest rise        Number of attempts at approach: 1       Number of other approaches attempted: 0       Secured with: silk tape       Ease of procedure: easy       Dentition: Intact and Unchanged    Medication(s) Administered   Medication Administration Time: 8/12/2022 10:15 AM

## 2022-08-12 NOTE — BRIEF OP NOTE
Brief Operative Note    Preop Dx:   Lumbosacral disc herniation [M51.27]  Lumbosacral radiculopathy at S1 [M54.17]  Post op Dx:   Same  Procedure:    Procedure(s):  Minimally Invasive Right Lumbar 5 to Sacral 1 Microdiscectomy  Surgeon:          Koby Martino MD - Primary     * Sae Drummond MD - Assisting  Assistants:    Delma Mckenzie PA-C  Anesthesia:   General  EBL:    <25mL  Total IV Fluids:  See Anesthesia Record  Specimens:   None  Findings:   See Operative Dictation  Complications:  Right dural tear/ nerve root injury; repaired intra-op.    Assessment and Plan: Rishi Noguera is a 22 year old male with PMH including asthma, headaches, marijuana use now s/p above procedure on 8/12/22 with Dr. Mabry.     Ortho ([facility]) Primary  Activity:   - HOB flat x 24 hours post-op due to dural tear  - after 24 hours: gradually remove HOB restrictions, see if patient tolerates. Up with assist until independent. No excessive bending or twisting. No lifting >10 lbs x 6 weeks.   Weight bearing status: WBAT.  Pain management: PO narcotics as tolerated. No NSAIDs x 3 months.   Antibiotics: Ancef x 24 hours.  Diet: Begin with clear fluids and progress diet as tolerated.   DVT prophylaxis: SCDs only. No chemical DVT ppx needed.  Imaging: none  Labs: Hgb POD#1.  Bracing/Splinting: None.  Dressings: Keep tegaderm c/d/i x 2 days.  Drains: none.  Torres catheter: none  Physical Therapy/Occupational Therapy: Eval and treat.  Cultures: none.    Consults: Hospitalist.  Follow-up: Clinic with Dr. Mabry  in 6 weeks with repeat x-rays.   Disposition: Pending progress with therapies, pain control on orals, and medical stability, anticipate discharge to home on POD #1-3.        The procedure was medically necessary for an assistant. My assistance was necessary for patient positioning, prepping and draping, soft tissue retraction, graft preparation and placement, and closure. The assistance that I provided reduced  "operative time which meant less general anesthetic for the patient.    Delma Mckenzie PA-C  Orthopedic Spine Surgery    Thank you for allowing me to participate in this patient's care. Please page me directly any questions/concerns.   Securely message with the Vocera Web Console (learn more here)  Text page via [employer] Paging/Directory    If there is no response, if it is a weekend, or if it is during evening hours, please page the orthopaedic surgery resident on call via [employer] Paging/Directory    Implants:   Implant Name Type Inv. Item Serial No.  Lot No. LRB No. Used Action   GRAFT DURAGEN 1X3\" [id number] - [id number]  GRAFT DURAGEN 1X3\" [id number]  [employer] [id number] N/A 1 Implanted       "

## 2022-08-12 NOTE — OR NURSING
PACU to Inpatient Nursing Handoff    Patient Rishi Noguera is a 22 year old male who speaks English.   Procedure Procedure(s):  Minimally Invasive Right Lumbar 5 to Sacral 1 Microdiscectomy   Surgeon(s) Primary: Koby Mabry MD  Assisting: Sae Drummond MD; Delma Mckenzie PA-C     No Known Allergies    Isolation  No active isolations     Past Medical History   has a past medical history of Asthma, Headache, and Lumbar disc herniation.    Anesthesia General   Dermatome Level     Preop Meds acetaminophen (Tylenol) - time given: 0840  gabapentin (Neurontin) - time given: 0840   Nerve block Not applicable   Intraop Meds dexamethasone (Decadron)  fentanyl (Sublimaze): 200 mcg total  hydromorphone (Dilaudid): 0.5 mg total  ondansetron (Zofran): last given at 1119  propofol gtt, muscle relaxant, fully reversed, phenylephrine bolus x1   Local Meds No   Antibiotics cefazolin (Ancef) - last given at 1007     Pain Patient Currently in Pain: yes   PACU meds  hydromorphone (Dilaudid): 0.6 mg (total dose) last given at 1410   hydroxizine (Vistaril/Atarax): 25 mg (total dose) last given at 1350   oxycodone (Roxicodone): 5 mg (total dose) last given at 1350   Neurontin 100mg PO at 1409   PCA / epidural No   Capnography     Telemetry ECG Rhythm: Normal sinus rhythm   Inpatient Telemetry Monitor Ordered? No        Labs Glucose Lab Results   Component Value Date     08/12/2022     07/22/2022       Hgb Lab Results   Component Value Date    HGB 17.2 07/22/2022       INR No results found for: INR   PACU Imaging Not applicable     Wound/Incision Incision/Surgical Site 08/12/22 Lower Back (Active)   Incision Assessment UTV 08/12/22 1330   Closure TRUDI 08/12/22 1330   Incision Drainage Amount None 08/12/22 1330   Dressing Intervention Clean, dry, intact 08/12/22 1330   Number of days: 0      CMS        Equipment ice pack   Other LDA       IV Access Peripheral IV 08/12/22 Left Hand (Active)   Site  Assessment WDL 08/12/22 1330   Line Status Saline locked 08/12/22 1330   Phlebitis Scale 0-->no symptoms 08/12/22 1330   Infiltration Scale 0 08/12/22 1330   Number of days: 0       Peripheral IV 08/12/22 Right Hand (Active)   Site Assessment WDL 08/12/22 1330   Line Status Infusing 08/12/22 1330   Phlebitis Scale 0-->no symptoms 08/12/22 1330   Infiltration Scale 0 08/12/22 1330   Number of days: 0      Blood Products Not applicable EBL <25 mL   Intake/Output Date 08/12/22 0700 - 08/13/22 0659   Shift 9472-0624 9757-1059 9527-5576 24 Hour Total   INTAKE   I.V. 1000   1000   Shift Total(mL/kg) 1000(10.99)   1000(10.99)   OUTPUT   Shift Total(mL/kg)       Weight (kg) 91 91 91 91      Drains / Torres Urethral Catheter 08/12/22 16 fr (Active)   Tube Description Other (Comment) 08/12/22 1245   Catheter Care Done 08/12/22 1245   Collection Container Leg bag 08/12/22 1330   Securement Method Leg strap 08/12/22 1330   Rationale for Continued Use Wound Healing;Strict 1-2 Hour I&O 08/12/22 1330   Urine Output 200 mL 08/12/22 1330   Number of days: 0      Time of void PreOp Void Prior to Procedure: 0845 (08/12/22 0843)    PostOp      Diapered? No   Bladder Scan     PO    water, crackers     Vitals    B/P: 134/75  T: 98.1  F (36.7  C)    Temp src: Axillary  P:  Pulse: 84 (08/12/22 1315)          R: 13  O2:  SpO2: 98 %    O2 Device: None (Room air) (08/12/22 0745)              Family/support present significant other   Patient belongings  1 pt  Belongings bag   Patient transported on cart   DC meds/scripts (obs/outpt) Not applicable   Inpatient Pain Meds Released? Yes       Special needs/considerations Patient required to have flat bedrest for 24 hours per Dr. Mabry   Tasks needing completion None       Mandie Andres, RN  ASCOM [id number]

## 2022-08-12 NOTE — INTERVAL H&P NOTE
"I have reviewed the surgical (or preoperative) H&P that is linked to this encounter, and examined the patient. There are no significant changes    Clinical Conditions Present on Arrival:  Clinically Significant Risk Factors Present on Admission                   # Overweight: Estimated body mass index is 29.63 kg/m  as calculated from the following:    Height as of this encounter: 1.753 m (5' 9\").    Weight as of this encounter: 91 kg (200 lb 9.9 oz).       "

## 2022-08-12 NOTE — ANESTHESIA CARE TRANSFER NOTE
Patient: Rishi Noguera    Procedure: Procedure(s):  Minimally Invasive Right Lumbar 5 to Sacral 1 Microdiscectomy       Diagnosis: Lumbosacral disc herniation [M51.27]  Lumbosacral radiculopathy at S1 [M54.17]  Diagnosis Additional Information: No value filed.    Anesthesia Type:   General     Note:    Oropharynx: oropharynx clear of all foreign objects and spontaneously breathing  Level of Consciousness: drowsy  Oxygen Supplementation: face mask  Level of Supplemental Oxygen (L/min / FiO2): 8  Independent Airway: airway patency satisfactory and stable  Dentition: dentition unchanged  Vital Signs Stable: post-procedure vital signs reviewed and stable  Report to RN Given: handoff report given  Patient transferred to: PACU    Handoff Report: Identifed the Patient, Identified the Reponsible Provider, Reviewed the pertinent medical history, Discussed the surgical course, Reviewed Intra-OP anesthesia mangement and issues during anesthesia, Set expectations for post-procedure period and Allowed opportunity for questions and acknowledgement of understanding      Vitals:  Vitals Value Taken Time   /75 08/12/22 1315   Temp 36.7  C (98.1  F) 08/12/22 1226   Pulse 84 08/12/22 1327   Resp 15 08/12/22 1327   SpO2 98 % 08/12/22 1327   Vitals shown include unvalidated device data.    Electronically Signed By: CARSON Jose CRNA  August 12, 2022  1:28 PM

## 2022-08-12 NOTE — OP NOTE
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    PROCEDURE PERFORMED:    1.  Repair and re-approximation of right nerve root with epineural microsutures  2.  Repair of durotomy with duragen and fibrin glue  3.  Use of intraoperative microscope    INDICATIONS:  This patient presented to Dr. Mabry, for management of right sided L5-S1 lumbosacral radiculopathy.  During a minimally invasive right lumbar 5 to sacral 1 microdiscectomy, a dural tear involving the nerve root was encountered with injury to the nerve root.  I was called in to assess and to help with repair.    DESCRIPTION OF PROCEDURE:  The microscope had been brought into the operative field already.  I scrubbed in and examined the durotomy with Dr. Mabry.  The thecal sac itself appeared to have intact dura.  There was CSF leaking from a tear in the dura at the shoulder of the nerve root, with nerve rootlets noted both proximally and distally.  The epineurium and perineurium were reapproximated using 6-0 Prolene sutures.  We were not able to directly repair the dura at this level as it had retracted.  There were no dural edges available for reapproximation.  There was also a little cerebrospinal fluid leaking from the sleeve itself.  Following irrigation, 2 portions of Duragen were cut and placed overlying the defect.  This was followed with DuraSeal.  At this point, Dr. Mabry took over for the remainder of the closure.    Sae Drummond MD        D: 2022   T: 2022   MT: KAYLA    Name:     CRUZ WOODSDian  MRN:      [id number]        Account:        [id number]   :      2000           Procedure Date: 2022     Document: [id number]

## 2022-08-12 NOTE — CONSULTS
"[facility]  Consult Note - Hospitalist Service, GOLD TEAM   Date of Admission:  8/12/2022  Consult Requested by: Delma Mckenzie PA-C  Reason for Consult: Post Operative co-Management     Assessment & Plan   Rishi Nogurea is a 22 year old male admitted on 8/12/2022. He has a known h/o Intermittent asthma, headaches and cannabis use, who underwent Minimally Invasive Right Lumbar 5 to Sacral 1 Microdiscectomy which was complicated by Right dural tear/ nerve root injury; repaired intra-op.   Internal Medicine complicated by post operative co-management  Individual problems and their management are outlined below    # S/P Right Lumbar 5 to Sacral 1 Microdiscectomy, POD#0  # Right dural tear/ nerve root injury, S/P intraoperative repair   Management by primary team. Please see their notes for further details  Stop IV fluids as patient is tolerating diet   Pain control with acetaminophen 975 mg every 8 hrs for 3 days  Oxycodone 5-10 mg every 3 hrs PRN aDVT prophylaxis with  SCDs while in hospital  Perioperative antibiotics as per primary team   Overnight Capnography monitoring  PT/OT as per protocol. For now 24 hrs SBR protocol   We will monitor for acute blood loss anemia. Pre op Hgb at 17.2      Intermittent Asthma:  PRN Albuterol as required  Recently completed a month of Advair and Singulair            The patient's care was discussed with the Bedside Nurse and Patient.    Lang Stallings MD  [facility]  Securely message with the Vocera Web Console (learn more here)  Text page via [facility] Paging/Directory   Please see signed in provider for up to date coverage information    Hospitalist Service, GOLD TEAM     Clinically Significant Risk Factors Present on Admission                    # Overweight: Estimated body mass index is 29.63 kg/m  as calculated from the following:    Height as of this encounter: 1.753 m (5' 9\").   "  Weight as of this encounter: 91 kg (200 lb 9.9 oz).        ______________________________________________________________________    Chief Complaint   S/P Minimally Invasive Right Lumbar 5 to Sacral 1 Microdiscectomy, complicated by Right dural tear/ nerve root injury; repaired intra-op    History is obtained from the patient, pre op physical and perioperative note     History of Present Illness   Rishi Noguera is a 22 year old male who underwent the above procedure today, which was complicated by right dural tear and nerve root injury which was repaired intraoperatively.  Patient underwent the procedure under general anesthesia with estimated blood loss of less than 25 cc.    Postoperatively, patient recovered uneventfully.  As a part of the dural tear repair protocol, he has been advised to lay flat for 24 hours postoperatively.  After 24 hours the restrictions will be gradually removed.  He denies any chest pain or shortness of breath.  He denies any fever or chills.  He denies any nausea, vomiting or diarrhea.  He does note numbness in his right lower extremity, consistent with the above procedure and intraoperative complications.    His past medical history was reviewed and all his medications were reconciled.  He is in a pleasant mood.  His girlfriend was at bedside.      General. LR 1000, EBL < 25  Review of Systems   The 10 point Review of Systems is negative other than noted in the HPI or here.     Past Medical History    I have reviewed this patient's medical history and updated it with pertinent information if needed.   Past Medical History:   Diagnosis Date     Asthma      Headache      Lumbar disc herniation        Past Surgical History   I have reviewed this patient's surgical history and updated it with pertinent information if needed.  Past Surgical History:   Procedure Laterality Date     wisdom teeth extraction         Social History   I have reviewed this patient's social history and updated it  with pertinent information if needed.  Social History     Tobacco Use     Smoking status: Never Smoker     Smokeless tobacco: Never Used   Substance Use Topics     Drug use: Yes     Types: Marijuana       Family History     Family history reviewed and non  Contributory to presenting condition     Medications   I have reviewed this patient's current medications    Allergies   No Known Allergies    Physical Exam   Vital Signs: Temp: 98.9  F (37.2  C) Temp src: Oral BP: 120/60 Pulse: 76   Resp: 16 SpO2: 94 % O2 Device: Nasal cannula Oxygen Delivery: 3 LPM  Weight: 200 lbs 9.9 oz    Constitutional: awake, alert, cooperative, no apparent distress, and appears stated age  Eyes: Lids and lashes normal, pupils equal, round and reactive to light, extra ocular muscles intact, sclera clear, conjunctiva normal  ENT: Normocephalic, without obvious abnormality, atraumatic, sinuses nontender on palpation, external ears without lesions, oral pharynx with moist mucous membranes  Respiratory: No increased work of breathing, good air exchange, clear to auscultation bilaterally, no crackles or wheezing  Cardiovascular: Normal apical impulse, regular rate and rhythm, normal S1 and S2, no S3 or S4, and no murmur noted  GI: No scars, normal bowel sounds, soft, non-distended, non-tender, no masses palpated, no hepatosplenomegally  Skin: no bruising or bleeding  Musculoskeletal: no lower extremity pitting edema present  Neurologic: Awake, alert, oriented to name, place and time.  Cranial nerves II-XII are grossly intact.      Data   Results for orders placed or performed during the hospital encounter of 08/12/22 (from the past 24 hour(s))   Glucose by meter   Result Value Ref Range    GLUCOSE BY METER POCT 113 (H) 70 - 99 mg/dL   XR Surgery ANTHONY L/T 5 Min Fluoro    Narrative    This exam was marked as non-reportable because it will not be read by a   radiologist or a [place] non-radiologist provider.

## 2022-08-12 NOTE — ANESTHESIA POSTPROCEDURE EVALUATION
Patient: Rishi Noguera    Procedure: Procedure(s):  Minimally Invasive Right Lumbar 5 to Sacral 1 Microdiscectomy       Anesthesia Type:  General    Note:  Disposition: Admission   Postop Pain Control: Uneventful            Sign Out: Well controlled pain   PONV: No   Neuro/Psych: Uneventful            Sign Out: Acceptable/Baseline neuro status   Airway/Respiratory: Uneventful            Sign Out: Acceptable/Baseline resp. status   CV/Hemodynamics: Uneventful            Sign Out: Acceptable CV status   Other NRE: NONE   DID A NON-ROUTINE EVENT OCCUR? No           Last vitals:  Vitals Value Taken Time   /81 08/12/22 1400   Temp 36.9  C (98.4  F) 08/12/22 1330   Pulse 78 08/12/22 1410   Resp 13 08/12/22 1410   SpO2 98 % 08/12/22 1410   Vitals shown include unvalidated device data.    Electronically Signed By: Thania Antony MD  August 12, 2022  2:11 PM

## 2022-08-13 ENCOUNTER — APPOINTMENT (OUTPATIENT)
Dept: CT IMAGING | Facility: CLINIC | Age: 22
End: 2022-08-13
Attending: INTERNAL MEDICINE
Payer: COMMERCIAL

## 2022-08-13 LAB
HGB BLD-MCNC: 16 G/DL (ref 13.3–17.7)
HOLD SPECIMEN: NORMAL

## 2022-08-13 PROCEDURE — 250N000009 HC RX 250: Performed by: ORTHOPAEDIC SURGERY

## 2022-08-13 PROCEDURE — 74177 CT ABD & PELVIS W/CONTRAST: CPT | Mod: 26 | Performed by: RADIOLOGY

## 2022-08-13 PROCEDURE — 250N000011 HC RX IP 250 OP 636: Performed by: STUDENT IN AN ORGANIZED HEALTH CARE EDUCATION/TRAINING PROGRAM

## 2022-08-13 PROCEDURE — 120N000002 HC R&B MED SURG/OB UMMC

## 2022-08-13 PROCEDURE — 258N000003 HC RX IP 258 OP 636

## 2022-08-13 PROCEDURE — 250N000011 HC RX IP 250 OP 636: Performed by: ORTHOPAEDIC SURGERY

## 2022-08-13 PROCEDURE — 258N000003 HC RX IP 258 OP 636: Performed by: INTERNAL MEDICINE

## 2022-08-13 PROCEDURE — 74177 CT ABD & PELVIS W/CONTRAST: CPT

## 2022-08-13 PROCEDURE — 250N000011 HC RX IP 250 OP 636: Performed by: PHYSICIAN ASSISTANT

## 2022-08-13 PROCEDURE — 36415 COLL VENOUS BLD VENIPUNCTURE: CPT | Performed by: PHYSICIAN ASSISTANT

## 2022-08-13 PROCEDURE — 99231 SBSQ HOSP IP/OBS SF/LOW 25: CPT | Performed by: INTERNAL MEDICINE

## 2022-08-13 PROCEDURE — 250N000011 HC RX IP 250 OP 636: Performed by: INTERNAL MEDICINE

## 2022-08-13 PROCEDURE — 85018 HEMOGLOBIN: CPT | Performed by: PHYSICIAN ASSISTANT

## 2022-08-13 PROCEDURE — 250N000013 HC RX MED GY IP 250 OP 250 PS 637: Performed by: STUDENT IN AN ORGANIZED HEALTH CARE EDUCATION/TRAINING PROGRAM

## 2022-08-13 PROCEDURE — 250N000013 HC RX MED GY IP 250 OP 250 PS 637: Performed by: PHYSICIAN ASSISTANT

## 2022-08-13 RX ORDER — AMOXICILLIN 250 MG
2 CAPSULE ORAL 2 TIMES DAILY
Status: DISCONTINUED | OUTPATIENT
Start: 2022-08-13 | End: 2022-08-14 | Stop reason: HOSPADM

## 2022-08-13 RX ORDER — GABAPENTIN 300 MG/1
300 CAPSULE ORAL 3 TIMES DAILY
Status: DISCONTINUED | OUTPATIENT
Start: 2022-08-13 | End: 2022-08-14 | Stop reason: HOSPADM

## 2022-08-13 RX ORDER — IOPAMIDOL 755 MG/ML
100 INJECTION, SOLUTION INTRAVASCULAR ONCE
Status: COMPLETED | OUTPATIENT
Start: 2022-08-13 | End: 2022-08-13

## 2022-08-13 RX ORDER — HYDROMORPHONE HCL IN WATER/PF 6 MG/30 ML
.2-.4 PATIENT CONTROLLED ANALGESIA SYRINGE INTRAVENOUS
Status: DISCONTINUED | OUTPATIENT
Start: 2022-08-13 | End: 2022-08-14 | Stop reason: HOSPADM

## 2022-08-13 RX ORDER — SODIUM CHLORIDE 9 MG/ML
INJECTION, SOLUTION INTRAVENOUS
Status: COMPLETED
Start: 2022-08-13 | End: 2022-08-13

## 2022-08-13 RX ORDER — SODIUM CHLORIDE 9 MG/ML
INJECTION, SOLUTION INTRAVENOUS CONTINUOUS
Status: DISPENSED | OUTPATIENT
Start: 2022-08-13 | End: 2022-08-14

## 2022-08-13 RX ORDER — DEXAMETHASONE SODIUM PHOSPHATE 10 MG/ML
10 INJECTION, SOLUTION INTRAMUSCULAR; INTRAVENOUS EVERY 8 HOURS
Status: DISCONTINUED | OUTPATIENT
Start: 2022-08-13 | End: 2022-08-14 | Stop reason: HOSPADM

## 2022-08-13 RX ADMIN — OXYCODONE HYDROCHLORIDE 10 MG: 10 TABLET ORAL at 07:45

## 2022-08-13 RX ADMIN — HYDROXYZINE HYDROCHLORIDE 25 MG: 25 TABLET ORAL at 01:01

## 2022-08-13 RX ADMIN — FAMOTIDINE 20 MG: 20 TABLET ORAL at 21:00

## 2022-08-13 RX ADMIN — CEFAZOLIN SODIUM 2 G: 2 INJECTION, SOLUTION INTRAVENOUS at 01:52

## 2022-08-13 RX ADMIN — FAMOTIDINE 20 MG: 20 TABLET ORAL at 07:47

## 2022-08-13 RX ADMIN — OXYCODONE HYDROCHLORIDE 10 MG: 10 TABLET ORAL at 16:13

## 2022-08-13 RX ADMIN — HYDROMORPHONE HYDROCHLORIDE 0.2 MG: 0.2 INJECTION, SOLUTION INTRAMUSCULAR; INTRAVENOUS; SUBCUTANEOUS at 10:25

## 2022-08-13 RX ADMIN — OXYCODONE HYDROCHLORIDE 5 MG: 5 TABLET ORAL at 21:01

## 2022-08-13 RX ADMIN — SODIUM CHLORIDE 65 ML: 9 INJECTION, SOLUTION INTRAVENOUS at 14:18

## 2022-08-13 RX ADMIN — GABAPENTIN 300 MG: 300 CAPSULE ORAL at 21:01

## 2022-08-13 RX ADMIN — ACETAMINOPHEN 975 MG: 325 TABLET ORAL at 20:59

## 2022-08-13 RX ADMIN — POLYETHYLENE GLYCOL 3350 17 G: 17 POWDER, FOR SOLUTION ORAL at 07:47

## 2022-08-13 RX ADMIN — OXYCODONE HYDROCHLORIDE 5 MG: 5 TABLET ORAL at 03:50

## 2022-08-13 RX ADMIN — GABAPENTIN 300 MG: 300 CAPSULE ORAL at 14:38

## 2022-08-13 RX ADMIN — GABAPENTIN 100 MG: 100 CAPSULE ORAL at 07:46

## 2022-08-13 RX ADMIN — SODIUM CHLORIDE: 9 INJECTION, SOLUTION INTRAVENOUS at 23:13

## 2022-08-13 RX ADMIN — GABAPENTIN 300 MG: 300 CAPSULE ORAL at 09:46

## 2022-08-13 RX ADMIN — ACETAMINOPHEN 975 MG: 325 TABLET ORAL at 12:50

## 2022-08-13 RX ADMIN — METHYLNALTREXONE BROMIDE 12 MG: 12 INJECTION, SOLUTION SUBCUTANEOUS at 12:13

## 2022-08-13 RX ADMIN — HYDROMORPHONE HYDROCHLORIDE 0.4 MG: 0.2 INJECTION, SOLUTION INTRAMUSCULAR; INTRAVENOUS; SUBCUTANEOUS at 01:43

## 2022-08-13 RX ADMIN — IOPAMIDOL 98 ML: 755 INJECTION, SOLUTION INTRAVENOUS at 14:18

## 2022-08-13 RX ADMIN — ACETAMINOPHEN 975 MG: 325 TABLET ORAL at 04:12

## 2022-08-13 RX ADMIN — DEXAMETHASONE SODIUM PHOSPHATE 10 MG: 10 INJECTION, SOLUTION INTRAMUSCULAR; INTRAVENOUS at 09:46

## 2022-08-13 RX ADMIN — SENNOSIDES AND DOCUSATE SODIUM 1 TABLET: 50; 8.6 TABLET ORAL at 07:47

## 2022-08-13 RX ADMIN — MAGNESIUM HYDROXIDE 30 ML: 400 SUSPENSION ORAL at 05:16

## 2022-08-13 RX ADMIN — SODIUM CHLORIDE: 9 INJECTION, SOLUTION INTRAVENOUS at 12:52

## 2022-08-13 RX ADMIN — SENNOSIDES AND DOCUSATE SODIUM 2 TABLET: 50; 8.6 TABLET ORAL at 21:01

## 2022-08-13 RX ADMIN — DEXAMETHASONE SODIUM PHOSPHATE 10 MG: 10 INJECTION, SOLUTION INTRAMUSCULAR; INTRAVENOUS at 18:06

## 2022-08-13 RX ADMIN — OXYCODONE HYDROCHLORIDE 10 MG: 10 TABLET ORAL at 12:45

## 2022-08-13 RX ADMIN — SODIUM CHLORIDE 500 ML: 9 INJECTION, SOLUTION INTRAVENOUS at 04:14

## 2022-08-13 RX ADMIN — METHOCARBAMOL 750 MG: 750 TABLET ORAL at 08:10

## 2022-08-13 ASSESSMENT — ACTIVITIES OF DAILY LIVING (ADL)
ADLS_ACUITY_SCORE: 36
ADLS_ACUITY_SCORE: 37
ADLS_ACUITY_SCORE: 37
ADLS_ACUITY_SCORE: 36
ADLS_ACUITY_SCORE: 37
ADLS_ACUITY_SCORE: 37
ADLS_ACUITY_SCORE: 36
ADLS_ACUITY_SCORE: 36
ADLS_ACUITY_SCORE: 37

## 2022-08-13 NOTE — PLAN OF CARE
"Pt C/O severe abdominal pain this morning. Writer messaged ortho and ordered new  bowel meds were given along with pain meds. Abdominal pain had not subsided and pt was diaphoretic and short of breath, bowel sounds were abssent. CT scan was ordered by Dr. Matthews. Constipation and gas were causing the pain. IV fluids were ordered and drinking more fluids were encouraged.  Pts abdominal pain subsided as the day went on.     VS:     BP (!) 137/90 (BP Location: Right arm)   Pulse 95   Temp 98.6  F (37  C) (Oral)   Resp 25   Ht 1.753 m (5' 9\")   Wt 91 kg (200 lb 9.9 oz)   SpO2 95%   BMI 29.63 kg/m      Pt A/O X 4. Afebrile. VSS. Lungs- clear bilaterally. Denies nausea, shortness of breath, and chest pain.     Output:       Bowels- audible in all four quadrants. Torres in place.     Activity:     Bedrest with 10 degree HOB elevation an hr, if no headache.      Skin: Scrape and bruise on inside left leg.   Spinal incision.     Pain:       Has pain in the back and given meds, ICE applied, and is tolerating pain at this time.      CMS:       CMS and Neuro's are intact.C/O numbness in right foot and ankle.     Dressing:     Spinal incision is CDI      Diet:       Pt is on a reg diet and appetite was poor this shift.       LDA:       PIV is patent in the right and left PIV is infusing      Equipment:      PCD's on BLE's.      Plan:       Pt is able to make needs known and the call light is within the pt's reach.        Additional Info:       .             "

## 2022-08-13 NOTE — PROGRESS NOTES
[facility]    Medicine Progress Note - Hospitalist Service, GOLD TEAM 19    Date of Admission:  8/12/2022    Assessment & Plan          Rishi Noguera is a 22 year old male admitted on 8/12/2022. He has a known h/o Intermittent asthma, headaches and cannabis use, who underwent minimally invasive right lumbar 5 to sacral 1 microdiscectomy which was complicated by right dural tear/nerve root injury; repaired intra-op. Internal Medicine complicated by post operative co-management  Individual problems and their management are outlined below    #Status post right lumbar 5 to sacral 1 microdiscectomy  #Right dural tear/ nerve root injury, status post intraoperative repair   Management by primary team. Please see their notes for further details  Will restart IV; nursing staff reports minimal oral intake, dark urine   Pain control with acetaminophen 975 mg every 8 hrs for 3 days  Oxycodone 5-10 mg every 3 hrs PRN aDVT prophylaxis with SCDs while in hospital  Perioperative antibiotics as per primary team   Overnight capnography monitoring  PT/OT as per protocol. For now 24 hrs SBR protocol   We will monitor for acute blood loss anemia. Pre op Hgb at 17.2    #Intermittent Asthma  PRN Albuterol as required  Recently completed a month of Advair and Singulair        Diet: Advance Diet as Tolerated: Regular Diet Adult    DVT Prophylaxis: Pneumatic Compression Devices  Torres Catheter: PRESENT, indication: Wound Healing;Strict 1-2 Hour I&O  Central Lines: None  Cardiac Monitoring: None  Code Status: Full Code      Disposition Plan      Expected Discharge Date: 08/14/2022                The patient's care was discussed with the Bedside Nurse and Patient.    Jairo Matthews DO  Hospitalist Service, GOLD TEAM 19  [facility]  Securely message with the Vocera Web Console (learn more here)  Text page via [employer] Paging/Directory   Please see signed in  "provider for up to date coverage information      Clinically Significant Risk Factors Present on Admission                    # Overweight: Estimated body mass index is 29.63 kg/m  as calculated from the following:    Height as of this encounter: 1.753 m (5' 9\").    Weight as of this encounter: 91 kg (200 lb 9.9 oz).      ______________________________________________________________________    Interval History   Patient reports significant low back, abdominal discomfort.  Per nursing staff, patient's oral intake is limited.  Per nursing staff, concern exists for dark urine.  Request surgical input on post-operative pain.    Data reviewed today: I reviewed all medications, new labs and imaging results over the last 24 hours. I personally reviewed no images or EKG's today.    Physical Exam   Vital Signs: Temp: 99  F (37.2  C) Temp src: Axillary BP: 133/81 Pulse: 95   Resp: 24 SpO2: 92 % O2 Device: Nasal cannula Oxygen Delivery: 3 LPM  Weight: 200 lbs 9.9 oz     GENERAL: Alert and oriented x 3; no acute distress; well-nourished.  HEENT: Normocephalic; atraumatic; PERRLA; MMM.  CV: RRR; normal S1, S2; no rubs, murmurs, or gallops.  RESP: Lung fields clear to aucultation B/L; no wheezing or crepitations.  GI: Abdomen is soft, nontender, nondistended; no organomegaly; normal bowel sounds.  : Deferred genital examination.   MSK: No clubbing, cyanosis, or edema.  DERM: Skin is intact; no rash, lesions, or skin breakdown.  NEURO: No focal deficits appreciated; strength & sensorium are grossly intact.  PSYCH: No active hallucinations; affect, insight appear within normal limits.    Data   Recent Labs   Lab 08/13/22  0754 08/12/22  0750   HGB 16.0  --    GLC  --  113*     No results found for this or any previous visit (from the past 24 hour(s)).  Medications     sodium chloride         acetaminophen  975 mg Oral Q8H     dexamethasone  10 mg Intravenous Q8H     famotidine  20 mg Oral BID     gabapentin  300 mg Oral TID     " methylnaltrexone  12 mg Subcutaneous Every Other Day     polyethylene glycol  17 g Oral Daily     senna-docusate  2 tablet Oral BID     sodium chloride (PF)  3 mL Intracatheter Q8H

## 2022-08-13 NOTE — PROGRESS NOTES
"/69 (BP Location: Right arm, Patient Position: Supine, Cuff Size: Adult Regular)   Pulse 107   Temp 98.9  F (37.2  C) (Oral)   Resp 18   Ht 1.753 m (5' 9\")   Wt 91 kg (200 lb 9.9 oz)   SpO2 96%   BMI 29.63 kg/m      Pt. AxO x4 takes medication whole. Primary problem minimally invasive R Lumbar 5 to sacral 1 microdiscectomy pt. Also has a dural tear. Pt. Currently on strict bed rest (lay flat for 24hrs) at the beginning of shift  pt. Complained about pain at surgical site. PRN Oxy, Robaxin and Atarax  given, was not effective. During the night pt. Pain appeared to become worse on-call was paged and an order for Dilaudid was given for break through pain 10/10. Pt. Was given 0.4 of  IV dilaudid. Pt. Pain came down to a 4/10. Pt. Later started to complain about stomach pain and distension Milk of magnesia was given. No BM on shift. Bladder scan was also done to rule out urin retention. Pt. Complained about numbness in right foot that was noticed after the surgery due to consistency of the numbness Ortho was paged  Pt. Possible discharge pending medical stability. Pt. Still has mendoza and is receiving IV abx. Continue plan of care.     "

## 2022-08-13 NOTE — PROGRESS NOTES
Orthopaedic Surgery Progress Note 08/13/2022    S: No acute events overnight.  Pain controlled. Some new numbness to right lateral and dorsal foot, denies weakness. Denies headache, N/V. Denies chest pain, SOB, nausea/vomiting. Tolerating PO. Voiding spontaneously.  Has not been OOB.     O:  Temp: 98.9  F (37.2  C) Temp src: Oral BP: 131/69 Pulse: 107   Resp: 18 SpO2: 96 % O2 Device: None (Room air) Oxygen Delivery: 3 LPM    Exam:  Gen: No acute distress, resting comfortably in bed.  Resp: Non-labored breathing  MSK:     Lumbar Spine:    Appearance - No gross stepoffs or deformities    Motor -     L2-3: Hip flexion 5/5 R and 5/5 L strength          L3/4:  Knee extension R 5/5 and L 5/5 strength         L4/5:  Foot dorsiflexion R 5/5 L 5/5 and       EHL dorsiflexion R 4/5 L 4/5 strength         S1:  Plantarflexion/Peroneal Muscles  R 5/5 and L 5/5 strength    Sensation: Decreased sensation L5/S1. Otherwise, intact to light touch L3-S1 distribution BLE    No lab results found in last 7 days.    Invalid input(s): SEDRATE    Assessment and Plan: Rishi Noguera is a 22 year old male s/p MIS R L5-S1 microdiscectomy 8/12/22 with Dr. Mabry.     08/13/22  PT/OT  Pain control  HOB flat  Steroids x 24 hours  Strict bowel regimen, hold for loose stools   Anticipate discharge tomorrow      Ortho (Raghavendra) Primary  Activity:   - HOB flat x 24 hours post-op due to dural tear  - after 24 hours: gradually remove HOB restrictions, see if patient tolerates. Up with assist until independent. No excessive bending or twisting. No lifting >10 lbs x 6 weeks.   Weight bearing status: WBAT  Pain management: PO narcotics as tolerated. No NSAIDs x 3 months.   Antibiotics: Ancef x 24 hours.  Diet: Begin with clear fluids and progress diet as tolerated.   DVT prophylaxis: SCDs only. No chemical DVT ppx needed.  Imaging: none  Labs: Hgb POD#1  Bracing/Splinting: None.  Dressings: Keep tegaderm c/d/i x 2 days.  Physical Therapy/Occupational Therapy:  Eval and treat.   Consults: Hospitalist.  Follow-up: Clinic with Dr. Mabry  in 6 weeks with repeat x-rays.   Disposition: Pending progress with therapies, pain control on orals, and medical stability, anticipate discharge to home on POD #1-3.    Harley Heaton MD  Orthopaedic Surgery PGY-4

## 2022-08-13 NOTE — PROGRESS NOTES
Hospitalist Service Crosscover Note      Rishi Noguera MRN# [id number]   YOB: 2000 Age: 22 year old           Interval History:   Contacted via [employer] about patient's uncontrolled pain. Rating pain 10/10 despite oral tylenol, oxycodone, and robaxin.     Reviewed operative notes. Acknowledge ortho post-op note calls for PO pain medications, but given lack of pain control with these, I ordered dilaudid 0.2 to 0.4 mg q2h PRN for tonight. Primary team can consider discontinuing in am at their discretion.     Cm Vogel MD   of Medicine  Med-Peds Hospitalist  Pager: [phone]  Cell: [phone]

## 2022-08-14 ENCOUNTER — APPOINTMENT (OUTPATIENT)
Dept: PHYSICAL THERAPY | Facility: CLINIC | Age: 22
End: 2022-08-14
Attending: PHYSICIAN ASSISTANT
Payer: COMMERCIAL

## 2022-08-14 VITALS
BODY MASS INDEX: 29.71 KG/M2 | HEIGHT: 69 IN | WEIGHT: 200.62 LBS | RESPIRATION RATE: 16 BRPM | OXYGEN SATURATION: 93 % | DIASTOLIC BLOOD PRESSURE: 76 MMHG | HEART RATE: 90 BPM | SYSTOLIC BLOOD PRESSURE: 122 MMHG | TEMPERATURE: 98.1 F

## 2022-08-14 LAB
ALBUMIN SERPL-MCNC: 3.3 G/DL (ref 3.4–5)
ALP SERPL-CCNC: 64 U/L (ref 40–150)
ALT SERPL W P-5'-P-CCNC: 17 U/L (ref 0–70)
ANION GAP SERPL CALCULATED.3IONS-SCNC: 4 MMOL/L (ref 3–14)
AST SERPL W P-5'-P-CCNC: 19 U/L (ref 0–45)
BASOPHILS # BLD AUTO: 0 10E3/UL (ref 0–0.2)
BASOPHILS NFR BLD AUTO: 0 %
BILIRUB SERPL-MCNC: 0.6 MG/DL (ref 0.2–1.3)
BUN SERPL-MCNC: 8 MG/DL (ref 7–30)
CALCIUM SERPL-MCNC: 9 MG/DL (ref 8.5–10.1)
CHLORIDE BLD-SCNC: 108 MMOL/L (ref 94–109)
CO2 SERPL-SCNC: 27 MMOL/L (ref 20–32)
CREAT SERPL-MCNC: 0.65 MG/DL (ref 0.66–1.25)
EOSINOPHIL # BLD AUTO: 0 10E3/UL (ref 0–0.7)
EOSINOPHIL NFR BLD AUTO: 0 %
ERYTHROCYTE [DISTWIDTH] IN BLOOD BY AUTOMATED COUNT: 12.5 % (ref 10–15)
GFR SERPL CREATININE-BSD FRML MDRD: >90 ML/MIN/1.73M2
GLUCOSE BLD-MCNC: 149 MG/DL (ref 70–99)
GLUCOSE BLDC GLUCOMTR-MCNC: 154 MG/DL (ref 70–99)
HCT VFR BLD AUTO: 46.1 % (ref 40–53)
HGB BLD-MCNC: 16.2 G/DL (ref 13.3–17.7)
IMM GRANULOCYTES # BLD: 0.1 10E3/UL
IMM GRANULOCYTES NFR BLD: 1 %
LYMPHOCYTES # BLD AUTO: 1.1 10E3/UL (ref 0.8–5.3)
LYMPHOCYTES NFR BLD AUTO: 7 %
MCH RBC QN AUTO: 29.4 PG (ref 26.5–33)
MCHC RBC AUTO-ENTMCNC: 35.1 G/DL (ref 31.5–36.5)
MCV RBC AUTO: 84 FL (ref 78–100)
MONOCYTES # BLD AUTO: 1.1 10E3/UL (ref 0–1.3)
MONOCYTES NFR BLD AUTO: 6 %
NEUTROPHILS # BLD AUTO: 15 10E3/UL (ref 1.6–8.3)
NEUTROPHILS NFR BLD AUTO: 86 %
NRBC # BLD AUTO: 0 10E3/UL
NRBC BLD AUTO-RTO: 0 /100
PLATELET # BLD AUTO: 255 10E3/UL (ref 150–450)
POTASSIUM BLD-SCNC: 4.1 MMOL/L (ref 3.4–5.3)
PROT SERPL-MCNC: 7.4 G/DL (ref 6.8–8.8)
RBC # BLD AUTO: 5.51 10E6/UL (ref 4.4–5.9)
SODIUM SERPL-SCNC: 139 MMOL/L (ref 133–144)
WBC # BLD AUTO: 17.3 10E3/UL (ref 4–11)

## 2022-08-14 PROCEDURE — 97116 GAIT TRAINING THERAPY: CPT | Mod: GP

## 2022-08-14 PROCEDURE — 97161 PT EVAL LOW COMPLEX 20 MIN: CPT | Mod: GP

## 2022-08-14 PROCEDURE — 36415 COLL VENOUS BLD VENIPUNCTURE: CPT | Performed by: INTERNAL MEDICINE

## 2022-08-14 PROCEDURE — 250N000013 HC RX MED GY IP 250 OP 250 PS 637: Performed by: STUDENT IN AN ORGANIZED HEALTH CARE EDUCATION/TRAINING PROGRAM

## 2022-08-14 PROCEDURE — 250N000011 HC RX IP 250 OP 636: Performed by: STUDENT IN AN ORGANIZED HEALTH CARE EDUCATION/TRAINING PROGRAM

## 2022-08-14 PROCEDURE — 97530 THERAPEUTIC ACTIVITIES: CPT | Mod: GP

## 2022-08-14 PROCEDURE — 85025 COMPLETE CBC W/AUTO DIFF WBC: CPT | Performed by: INTERNAL MEDICINE

## 2022-08-14 PROCEDURE — 99231 SBSQ HOSP IP/OBS SF/LOW 25: CPT | Performed by: INTERNAL MEDICINE

## 2022-08-14 PROCEDURE — 250N000013 HC RX MED GY IP 250 OP 250 PS 637: Performed by: PHYSICIAN ASSISTANT

## 2022-08-14 PROCEDURE — 80053 COMPREHEN METABOLIC PANEL: CPT | Performed by: INTERNAL MEDICINE

## 2022-08-14 RX ORDER — GABAPENTIN 300 MG/1
300 CAPSULE ORAL 3 TIMES DAILY
Qty: 90 CAPSULE | Refills: 0 | Status: SHIPPED | OUTPATIENT
Start: 2022-08-14

## 2022-08-14 RX ORDER — POLYETHYLENE GLYCOL 3350 17 G/17G
17 POWDER, FOR SOLUTION ORAL DAILY
Qty: 510 G | Refills: 0 | Status: SHIPPED | OUTPATIENT
Start: 2022-08-14

## 2022-08-14 RX ADMIN — FAMOTIDINE 20 MG: 20 TABLET ORAL at 10:39

## 2022-08-14 RX ADMIN — DEXAMETHASONE SODIUM PHOSPHATE 10 MG: 10 INJECTION, SOLUTION INTRAMUSCULAR; INTRAVENOUS at 10:40

## 2022-08-14 RX ADMIN — OXYCODONE HYDROCHLORIDE 5 MG: 5 TABLET ORAL at 10:37

## 2022-08-14 RX ADMIN — GABAPENTIN 300 MG: 300 CAPSULE ORAL at 10:39

## 2022-08-14 RX ADMIN — METHOCARBAMOL 750 MG: 750 TABLET ORAL at 01:27

## 2022-08-14 RX ADMIN — DEXAMETHASONE SODIUM PHOSPHATE 10 MG: 10 INJECTION, SOLUTION INTRAMUSCULAR; INTRAVENOUS at 01:29

## 2022-08-14 RX ADMIN — BISACODYL 10 MG: 10 SUPPOSITORY RECTAL at 01:34

## 2022-08-14 RX ADMIN — OXYCODONE HYDROCHLORIDE 5 MG: 5 TABLET ORAL at 01:27

## 2022-08-14 RX ADMIN — POLYETHYLENE GLYCOL 3350 17 G: 17 POWDER, FOR SOLUTION ORAL at 10:40

## 2022-08-14 RX ADMIN — SENNOSIDES AND DOCUSATE SODIUM 2 TABLET: 50; 8.6 TABLET ORAL at 10:39

## 2022-08-14 RX ADMIN — ACETAMINOPHEN 975 MG: 325 TABLET ORAL at 06:05

## 2022-08-14 RX ADMIN — OXYCODONE HYDROCHLORIDE 5 MG: 5 TABLET ORAL at 06:05

## 2022-08-14 ASSESSMENT — ACTIVITIES OF DAILY LIVING (ADL)
ADLS_ACUITY_SCORE: 36
ADLS_ACUITY_SCORE: 37
ADLS_ACUITY_SCORE: 36
ADLS_ACUITY_SCORE: 37
ADLS_ACUITY_SCORE: 36

## 2022-08-14 NOTE — PROGRESS NOTES
08/14/22 0929   Quick Adds   Type of Visit Initial PT Evaluation       Present no   Living Environment   People in Home significant other   Current Living Arrangements other (see comments)  (duplex)   Home Accessibility stairs to enter home   Number of Stairs, Main Entrance greater than 10 stairs  (flight)   Stair Railings, Main Entrance railing on right side (ascending)   Transportation Anticipated family or friend will provide;car, drives self   Living Environment Comments PT: Pt will be staying w/ SO in duplex in [place]; has flight to enter.   Self-Care   Usual Activity Tolerance excellent   Current Activity Tolerance moderate   Regular Exercise No   Equipment Currently Used at Home none   Fall history within last six months no   Activity/Exercise/Self-Care Comment PT: Pt IND at baseline, works active job in [place]. Back injury sustained at work. Family has FWW if needed.   General Information   Onset of Illness/Injury or Date of Surgery 08/12/22   Referring Physician Delma Mckenzie, TC   Patient/Family Therapy Goals Statement (PT) to return to PLOF   Pertinent History of Current Problem (include personal factors and/or comorbidities that impact the POC) Rishi Noguera is a 22 year old male s/p MIS R L5-S1 microdiscectomy 8/12/22 with Dr. Mabry. Right dural tear/ nerve root injury; repaired intra-op   Existing Precautions/Restrictions fall;spinal   Weight-Bearing Status - LUE partial weight-bearing (% in comments)  (<10#)   Weight-Bearing Status - RUE partial weight-bearing (% in comments)  (<10#)   General Observations PT: flat bed rest restrictions lifted, up w/ assist   Cognition   Affect/Mental Status (Cognition) WNL   Orientation Status (Cognition) oriented x 4   Follows Commands (Cognition) WNL   Pain Assessment   Patient Currently in Pain Yes, see Vital Sign flowsheet  (at surgical site)   Integumentary/Edema   Integumentary/Edema no deficits were identifed   Posture    Posture  Not impaired   Range of Motion (ROM)   Range of Motion ROM is WNL   ROM Comment within precautions   Strength (Manual Muscle Testing)   Strength (Manual Muscle Testing) strength is WNL   Bed Mobility   Comment, (Bed Mobility) Lisseth at trunk, HOB 30 degrees   Transfers   Comment, (Transfers) CGA sit<>stand   Gait/Stairs (Locomotion)   Comment, (Gait/Stairs) CGA w/ FWW, slow cautious stepping 2/2 pain   Balance   Balance Comments IND sitting balance, CGA for standing balance w/o UE support   Sensory Examination   Sensory Perception patient reports no sensory changes   Coordination   Coordination no deficits were identified   Muscle Tone   Muscle Tone no deficits were identified   Clinical Impression   Criteria for Skilled Therapeutic Intervention Yes, treatment indicated   PT Diagnosis (PT) impaired functional IND   Influenced by the following impairments pain, precautions, decreased activity tolerance, functional balance   Functional limitations due to impairments decreased (I) w/ bed mobility, transfers, gait , home access   Clinical Presentation (PT Evaluation Complexity) Stable/Uncomplicated   Clinical Presentation Rationale current status, clinical judgement, PMH   Clinical Decision Making (Complexity) low complexity   Planned Therapy Interventions (PT) balance training;bed mobility training;gait training;home exercise program;patient/family education;stair training;strengthening;transfer training;progressive activity/exercise;risk factor education;home program guidelines   Anticipated Equipment Needs at Discharge (PT)   (none, has FWW)   Risk & Benefits of therapy have been explained evaluation/treatment results reviewed;care plan/treatment goals reviewed;current/potential barriers reviewed;risks/benefits reviewed;participants voiced agreement with care plan;participants included;patient;spouse/significant other;mother   Clinical Impression Comments PT: Pt presents below baseline 2/2 post-op pain, new precautions  and decreased activity tolerance and balance. Will benefit from PT to progress functional IND and faciliate safe home discharge.   PT Discharge Planning   PT Discharge Recommendation (DC Rec) home with assist;home with outpatient physical therapy   PT Rationale for DC Rec Pt okay to discharge home when medically ready w/ PRN assist from SO. No DME needs, pt ambulatory w/o device, but family has FWW for use PRN. Eventual OP PT to assist in returning to high PLOF.   PT Brief overview of current status SBA w/o device for gait and stairs   Plan of Care Review   Plan of Care Reviewed With patient;mother;significant other   Total Evaluation Time   Total Evaluation Time (Minutes) 10   Physical Therapy Goals   PT Frequency One time eval and treatment only   PT Predicted Duration/Target Date for Goal Attainment 08/14/22   PT Goals Bed Mobility;Transfers;Gait;Stairs   PT: Bed Mobility Minimal assist;Supine to/from sit;Rolling   PT: Transfers Supervision/stand-by assist;Sit to/from stand;Bed to/from chair   PT: Gait Supervision/stand-by assist;150 feet   PT: Stairs Supervision/stand-by assist;Greater than 10 stairs;Rail on right

## 2022-08-14 NOTE — PROGRESS NOTES
Orthopaedic Surgery Progress Note 08/14/2022    S: No acute events overnight.  Pain controlled. Denies headache, N/V. Denies chest pain, SOB, nausea/vomiting. CT abd/pelvis with mild gas distension and mild stool burden. Abdominal pain resolved. +flatus, +BM.  RLE tingling still present, improved from yesterday.Tolerating PO. Voiding via mendoza.  Has not been OOB.     O:  Temp: 98.9  F (37.2  C) Temp src: Oral BP: 138/86 Pulse: 80   Resp: 25 SpO2: 95 % O2 Device: None (Room air)      Exam:  Gen: No acute distress, resting comfortably in bed.  Resp: Non-labored breathing  MSK:     Lumbar Spine:    Appearance - No gross stepoffs or deformities    Motor -     L2-3: Hip flexion 5/5 R and 5/5 L strength          L3/4:  Knee extension R 5/5 and L 5/5 strength         L4/5:  Foot dorsiflexion R 5/5 L 5/5 and       EHL dorsiflexion R 4/5 L 4/5 strength         S1:  Plantarflexion/Peroneal Muscles  R 5/5 and L 5/5 strength    Sensation: Decreased sensation L5/S1. Otherwise, intact to light touch L3-S1 distribution BLE    Recent Labs   Lab 08/13/22  0754   HGB 16.0     Assessment and Plan: Rishi Noguera is a 22 year old male s/p MIS R L5-S1 microdiscectomy 8/12/22 with Dr. Mabry. Right dural tear/ nerve root injury; repaired intra-op    08/14/22  PT/OT  Pain control  Discontinue mendoza  Anticipate discharge home if mobilizes well without HA/severe pain     Ortho (Raghavendra) Primary  Activity:   - HOB flat x 24 hours post-op due to dural tear  - after 24 hours: gradually remove HOB restrictions, see if patient tolerates. Up with assist until independent. No excessive bending or twisting. No lifting >10 lbs x 6 weeks.   Weight bearing status: WBAT  Pain management: PO narcotics as tolerated. No NSAIDs x 3 months.   Antibiotics: Ancef x 24 hours - complete  Diet: Begin with clear fluids and progress diet as tolerated.   DVT prophylaxis: SCDs only. No chemical DVT ppx needed.  Imaging: none  Labs: Hgb POD#1  Bracing/Splinting:  None.  Dressings: Keep tegaderm c/d/i x 2 days.  Physical Therapy/Occupational Therapy: Eval and treat.   Consults: Hospitalist.  Follow-up: Clinic with Dr. Mabry  in 6 weeks with repeat x-rays.   Disposition: Pending progress with therapies, pain control on orals, and medical stability, anticipate discharge to home on POD #1-3.    Harley Heaton MD  Orthopaedic Surgery PGY-4

## 2022-08-14 NOTE — DISCHARGE SUMMARY
ORTHOPAEDIC SURGERY DISCHARGE SUMMARY     Date of Admission: 8/12/2022  Date of Discharge: 8/14/2022  Disposition: Home  Staff Physician: Koby Mabry*  Primary Care Provider: [facility]      ADMISSION DIAGNOSIS:  Lumbosacral disc herniation [M51.27]  Lumbosacral radiculopathy at S1 [M54.17]    DISCHARGE DIAGNOSIS:  Lumbosacral disc herniation [M51.27]  Lumbosacral radiculopathy at S1 [M54.17]    PROCEDURES: Procedure(s):  Minimally Invasive Right Lumbar 5 to Sacral 1 Microdiscectomy on 8/12/2022    BRIEF HISTORY:  22 year old male with right S1 radiculopathy due to right L5-S1 disc herniation. After a discussion of the risks, benefits, and alternatives he wished to proceed with the above procedures.     HOSPITAL COURSE:    The patient was admitted following the above listed procedures for pain control and rehabilitation. Rishi Noguera did well post-operatively. Due to dural tear, patient was with flat HOB x 24 hours post-op. After 24 hours HOB restrictions were gradually removed and the patient tolerated this well. Medicine was consulted post operatively to aid in management of medical co-morbidities. The patient received routine nursing cares and at the time of discharge was medically stable. Vital signs were stable throughout admission. The patient is tolerating a regular diet and is voiding spontaneously. All PT/OT goals have been met for safe mobility. Pain is now controlled on oral medications which will be available on discharge. Stool softeners have been used while taking pain medications to help prevent constipation. Rishi Noguera is deemed medically safe to discharge on POD2.     Antibiotics:  Ancef given periop and 24 hours postop.   DVT prophylaxis:  SCDs only. No chemical DVT ppx needed.  PT Progress:  Has met PT/OT goals for safe mobility. PT recommending home   Pain Meds:  Weaned off all IV pain meds by discharge.  Inpatient Events: Right dural tear/ nerve root injury; repaired  intra-op    PHYSICAL EXAM:    Gen: No acute distress, resting comfortably in bed.  Resp: Non-labored breathing  MSK:                 Lumbar Spine:                          Appearance - No gross stepoffs or deformities                          Motor -                           L2-3: Hip flexion 5/5 R and 5/5 L strength                           L3/4:  Knee extension R 5/5 and L 5/5 strength                          L4/5:  Foot dorsiflexion R 5/5 L 5/5 and                                       EHL dorsiflexion R 4/5 L 4/5 strength                          S1:  Plantarflexion/Peroneal Muscles  R 5/5 and L 5/5 strength                          Sensation: Decreased sensation L5/S1. Otherwise, intact to light touch L3-S1 distribution BLE    FOLLOWUP:      Future Appointments   Date Time Provider Department Center   8/14/2022  8:30 AM Sade Conley PT [facility]   8/15/2022  7:00 PM [facility] OT WAITLIST [facility]   9/26/2022 11:15 AM Koby Mabry MD [facility]       Orthopaedic Surgery appointments are at the [facility] ([address] [place] [address]). Call [phone] to schedule a follow-up appointment at this location with your provider.     PLANNED DISCHARGE ORDERS:     DVT Prophylaxis: SCDs only. No chemical DVT ppx needed.     Activity: Up with assist until independent. No excessive bending or twisting. No lifting >10 lbs x 6 weeks.     Wound Care: see Below      Discharge Medication List as of 8/14/2022  1:09 PM      START taking these medications    Details   acetaminophen (TYLENOL) 325 MG tablet Take 2 tablets (650 mg) by mouth every 4 hours as needed for mild pain, Disp-60 tablet, R-1, E-Prescribe      gabapentin (NEURONTIN) 300 MG capsule Take 1 capsule (300 mg) by mouth 3 times daily, Disp-90 capsule, R-0, E-Prescribe      ondansetron (ZOFRAN ODT) 4 MG ODT tab Take 1 tablet (4 mg) by mouth every 8 hours as needed for nausea, Disp-4 tablet, R-0,  E-Prescribe      oxyCODONE (ROXICODONE) 5 MG tablet Take 1 tablet (5 mg) by mouth every 4 hours as needed for severe pain, Disp-20 tablet, R-0, E-Prescribe      polyethylene glycol (MIRALAX) 17 GM/Dose powder Take 17 g by mouth daily, Disp-510 g, R-0, E-Prescribe      senna-docusate (SENOKOT-S/PERICOLACE) 8.6-50 MG tablet Take 1-2 tablets by mouth 2 times daily, Disp-30 tablet, R-0, E-Prescribe         CONTINUE these medications which have NOT CHANGED    Details   albuterol (PROAIR HFA/PROVENTIL HFA/VENTOLIN HFA) 108 (90 Base) MCG/ACT inhaler Inhale 2 puffs into the lungs every 4 hours as needed, Historical      triamcinolone (KENALOG) 0.1 % external cream Apply topically 2 times daily as neededHistorical               Discharge Procedure Orders   Discharge Instructions   Order Comments: Review outpatient procedure discharge instructions as directed by Provider.     Notify Provider   Order Comments: Signs and symptoms of infection: Fever greater than 101, redness, swelling, heat at site, drainage, or pus     Ice to affected area   Order Comments: Ice pack to surgical site every 15 minutes per hour for 24 hours     Shower - Do not soak     Remove dressing - 48 hours     Wound care   Order Comments: Do not immerse wound in water until incision is completely healed     Discharge Instructions    Order Comments: Diet as tolerated. Return to diet before surgery.     Discharge Instructions - Lifting Limit (specify)   Order Comments: Lifting limit of  10 pounds until seen at Post-op follow up appointment. Avoid bending and twisting.     Weight bearing - As tolerated     Return to Clinic   Order Comments: Return to Clinic in 6  weeks     No Aspirin, Ibuprofen or Naproxen products    Order Comments: for 7 - 10 days post surgery     No driving or operating machinery   Order Comments: While on narcotic medications.     No Alcohol   Order Comments: No Alcohol for 24 hours after procedure     Activity   Order Comments: No  excessive bending or twisting. No lifting >10 lbs x 6 weeks.   Continue to take bowel regimen daily     Order Specific Question Answer Comments   Is discharge order? Yes      Reason for your hospital stay   Order Comments: Lumbar spine surgery     Diet   Order Comments: Follow this diet upon discharge: Orders Placed This Encounter      Advance Diet as Tolerated: Regular Diet Adult     Order Specific Question Answer Comments   Is discharge order? Yes      Harley Heaton MD  Orthopaedic Surgery PGY-4

## 2022-08-14 NOTE — PLAN OF CARE
Physical Therapy Discharge Summary    Reason for therapy discharge:    Discharged to home with outpatient therapy.    Progress towards therapy goal(s). See goals on Care Plan in [facility] electronic health record for goal details.  Goals met    Therapy recommendation(s):    Pt okay to discharge home when medically ready w/ PRN assist from SO. No DME needs, pt ambulatory SBA-IND w/o device, but family has FWW for use PRN. Stairs performed SBA. Eventual OP PT to assist in returning to high PLOF.    Goal Outcome Evaluation:    Plan of Care Reviewed With: patient, mother, significant other

## 2022-08-14 NOTE — PROGRESS NOTES
[facility]    Medicine Progress Note - Hospitalist Service, GOLD TEAM 18    Date of Admission:  8/12/2022    Assessment & Plan   Rishi Noguera is a 22 year old male admitted on 8/12/2022. He has a known h/o Intermittent asthma, headaches and cannabis use.  He underwent minimally invasive right lumbar 5 to sacral 1 microdiscectomy on 8/12/22 which was complicated by right dural tear/nerve root injury; repaired intra-op. Internal Medicine consulted for postop medical co-management    S/p right lumbar 5 to sacral 1 microdiscectomy  Right dural tear/ nerve root injury, s/p intraoperative repair   ---   POD #2  ---   PCD for DVT prophylaxis  ---   Postop pain management w/ tylenol, oxycodone, atarax and IV dilaudid.   ---   PT/OT following    Intermittent Asthma  ---   Lungs are clear on exam  ---   Continue prn albuterol   ---   Recently completed a month of Advair and Singulair         Diet: Advance Diet as Tolerated: Regular Diet Adult  Diet    DVT Prophylaxis: Pneumatic Compression Devices  Torres Catheter: Not present  Central Lines: None  Cardiac Monitoring: None  Code Status: Full Code      Disposition Plan   Per ortho     Expected Discharge Date: 08/14/2022                     Linda William MD  Hospitalist Service, GOLD TEAM 18  [facility]  Securely message with the Vocera Web Console (learn more here)  Text page via [employer] Paging/Directory   Please see signed in provider for up to date coverage information      __________________________________________________________________    Interval History   Postop pain under good control  Got SOB earlier today when he got out of bed to sit in a chair  No other complaints during my eval  Uneventful night    Data reviewed today: I reviewed all medications, new labs and imaging results over the last 24 hours. I personally reviewed no images or EKG's today.    Physical Exam   Vital  Signs: Temp: 98.1  F (36.7  C) Temp src: Oral BP: 122/76 Pulse: 90   Resp: 16 SpO2: 93 % O2 Device: None (Room air)    Weight: 200 lbs 9.9 oz   General: aao x 3, NAD.  HEENT:  NC/AT, PERRL, EOMI, neck supple, no thyromegaly, op clear, mmm.  CVS:  NL s 1 and s2, no m/r/g.  Lungs:  CTA B/L.   Abd:  Soft, + bs, NT, no rebound or gaurding, no fluid shift.  Ext:  No c/c.  Lymph:  No edema.  Neuro:  Nonfocal.  Musculoskeletal: No calf tenderness to palpation.    Skin:  No rash.  Psychiatry:  Mood and affect appropriate.  Back exam:  Deferred    Data   Recent Labs   Lab 08/14/22  0559 08/14/22  0558 08/13/22  0754 08/12/22  0750   WBC 17.3*  --   --   --    HGB 16.2  --  16.0  --    MCV 84  --   --   --      --   --   --      --   --   --    POTASSIUM 4.1  --   --   --    CHLORIDE 108  --   --   --    CO2 27  --   --   --    BUN 8  --   --   --    CR 0.65*  --   --   --    ANIONGAP 4  --   --   --    THIEN 9.0  --   --   --    * 154*  --  113*   ALBUMIN 3.3*  --   --   --    PROTTOTAL 7.4  --   --   --    BILITOTAL 0.6  --   --   --    ALKPHOS 64  --   --   --    ALT 17  --   --   --    AST 19  --   --   --      Recent Results (from the past 24 hour(s))   CT Abdomen Pelvis w Contrast    Narrative    EXAMINATION: CT ABDOMEN PELVIS W CONTRAST, 8/13/2022 2:31 PM    TECHNIQUE: Axial CT images from the lung bases through the symphysis  pubis were obtained  with IV contrast. Coronal and sagittal reformats  also provided. Contrast dose: 98mL isovue 370    COMPARISON: None    HISTORY: Intractable abdominal pain; no bowel sounds reported    FINDINGS:    Lung Bases:   Bibasilar subsegmental atelectasis. The cardiac size is normal and  without pericardial effusion.    ABDOMEN:  Liver: Normal size. No focal lesions.    Biliary/Gallbladder: No CT evidence of calculi, wall thickening or  pericholecystic fluid. No intra or extrahepatic biliary dilatation.    Spleen: Normal size. No focal lesions.    Pancreas: No  evidence of pancreatic mass or peripancreatic fluid.    Adrenals: Normal.    Kidneys: No hydronephrosis, calculi or mass. Subcentimeter cyst in the  left kidney.    Urinary bladder: Unremarkable. Torres catheter in the bladder.    Reproductive organs: Prostate and seminal vesicles are unremarkable.    Gastrointestinal: The stomach and duodenum are unremarkable. There is  mild gaseous distention of the colon but diameter is within normal  limits. No pneumatosis or portal venous gas. Small bowel is normal in  caliber and without abnormal wall thickening.    Mesentery/Peritoneum: No ascites or pneumoperitoneum.    Lymph nodes: No lymphadenopathy.    Vasculature: Major abdominal arteries are patent.    Bones and soft tissues: Postsurgical changes at the L5-S1 level with  extraspinal and intraspinal foci of air. No aggressive lytic or  sclerotic lesions.      Impression    IMPRESSION:   1.  No acute abnormality in the abdomen/pelvis.  2.  No bowel obstruction. Mild gaseous distention of the right-sided  colon with mild stool burden.   3.  Postsurgical changes at the L5-S1 level.     DAPHNIE CUENCA MD         SYSTEM ID:  [id number]     Medications       acetaminophen  975 mg Oral Q8H     dexamethasone  10 mg Intravenous Q8H     famotidine  20 mg Oral BID     gabapentin  300 mg Oral TID     methylnaltrexone  12 mg Subcutaneous Every Other Day     polyethylene glycol  17 g Oral Daily     senna-docusate  2 tablet Oral BID     sodium chloride (PF)  3 mL Intracatheter Q8H

## 2022-08-14 NOTE — PROGRESS NOTES
"BP (!) 142/84 (BP Location: Right arm, Patient Position: Supine)   Pulse 87   Temp 98.3  F (36.8  C) (Oral)   Resp 19   Ht 1.753 m (5' 9\")   Wt 91 kg (200 lb 9.9 oz)   SpO2 95%   BMI 29.63 kg/m      Pt. Admitted for l5-l1 microdiscectom/ R dural tear. Pt. Hx. Of intermittent asthma, headaches and cannabis use.  Pt. AxO x4  And make his needs known. Pt. Was given Oxy (2) and Robaxin this shift. Pt. Pain appeared to managed thru oxy 5mg pain remained around a 4/5 most of the night. Pt. Was given suppository during the shift, Pt. Had a small BM  CMS/ Neuro intact. Numbness in RLE appears to be better tolerated per pt. Surgical incisions intact. Torres still in place. Head of bed elevated 20-30. Skin intact, Pt. On IV fluids 125 ml/hr due to dark urine output . Possible discharge today. Continue with care of plan.    "

## 2022-08-14 NOTE — PLAN OF CARE
Pt. discharged at 1:30 to home, and left with personal belongings. Pt. received complete discharge paperwork and all medications as filled by discharge pharmacy. Pt received and signed for the narcotic medication oxycodone. Pt. was given times of last dose for all discharge medications in writing on discharge medication sheets. Discharge teaching included how to take medication, pain management, activity restrictions, dressing changes, and signs and symptoms of infection. Dressing supplies sent home. Pt. to follow up with HCPin 6 weeks. Pt. had no further questions at the time of discharge and no unmet needs were identified.

## 2022-08-16 NOTE — OP NOTE
DATE OF SURGERY: 8/12/2022    PREOPERATIVE DIAGNOSIS: Lumbosacral disc herniation   Lumbosacral radiculopathy at S1      POSTOPERATIVE DIAGNOSIS:    PROCEDURES:  1. RIght Lumbar 5 to Sacral 1 microdiscectomy    PRIMARY SURGEON: Koby Mabry MD    FIRST ASSISTANT: NAVID Ford, there was no qualified assistant in the resident was not available, this assistant was needed for retraction visualization and wound closure    ANESTHESIA: General Endotracheal    COMPLICATIONS:  Dural Tear of right S1 root sleeve    SPECIMENS: None.    ESTIMATED BLOOD LOSS: 25cc    INDICATIONS:                          Rishi Noguera is a 22 year old male who elected surgical treatment, and understood the indications for this surgery, as well as its risks, benefits, and alternatives as documented in the pre-operative H&P.  Specifically, we reviewed the risks and benefits of the surgery in detail. The risks include, but are not limited to, the general risks associated with anesthesia, including death, pulmonary embolism, DVT, stroke, myocardial infarction, pneumonia, and urinary tract infection. Additional risks specific to the surgery include the risk of infection, dural tear with resultant CSF leak which might necessitate placement of a drain or revision surgery or could result in headaches, nerve injury resulting in weakness or paralysis, risk of adjacent segment disease, the risks of vascular injury, need for revision surgery in the future due to one of the above issues, or risk of incomplete symptom relief. Rishi Noguera understands the risks of the surgery and wishes to proceed.  No Guarantees were given.       DESCRIPTION OF PROCEDURE:           Rishi Noguera was taken to the operating room, where the Anesthesiology Service induced satisfactory general anesthesia. Ancef was given IV.  Venous thromboembolic prophylaxis was performed with sequential devices.  The patient was placed prone on an open OSI frame with the abdomen  hanging free and all bony prominences well padded.  The low back was then prepped and draped in its entirety in the usual sterile fashion.  We then held a multidisciplinary time out in which we verified the patient, procedure, antibiotics, and operative plan.  All team members were in agreement.    Digital Radiography was brought into the sterile field to obtain a true lateral view and needles were placed to stephanie the intended point of incision.     I then made a an 18 mm incision, inserted serial dilators down under fluoroscopic guidance, and docked our tubular retractor.  I then used cautery to clean off the muscles over the L5 and S1 lamina and identify the L5-S1 facet joint.  A bur was used to remove the medial aspect of the facet and leading edge of the L5 lamina which I then undercut with a curette and detach the ligamentum flavum.  I was able to mobilize the nerve root underneath.  I identified a disc herniation and incised into it, and we retrieved about 1 cc of loose disc material.  I felt with a [place] and felt that there was additional disc material distally.  While using a Kerrison to resect the leading edge of the S1 lamina, I saw flash of spinal fluid leak.  It was clear that there had been an injury to the exiting S1 root dural sleeve as it traversed the pedicle.,  I changed my visualization, and again retracted the root, and at this point completed any remaining discectomy.  I also obtained hemostasis.  I then went about assessing the spinal fluid leak.  There were some nerve rootlets visible from the dural tear area.  I was concerned it would be difficult to get a water tight seal.  I called and my partner from neurosurgery, Dr. Sae Drummond, who assessed the spinal fluid leak with me.  We tried to reapproximate what we could, but it was not possible to get a watertight seal given the location of the leak.  6-0 Prolene was used to reapproximate a portion of it.  We then placed DuraGen patch and  DuraSeal over it after having obtained appropriate hemostasis.    The wound was then thoroughly irrigated.  Hemostasis was achieved.  A hemovac drain was not placed.  The wound was closed in layers with vicryl suture, followed by monocryl and dermabond for the skin.  A sterile dressing was applied. The patient was turned supine, extubated, and returned to the recovery area in stable condition.      I was present and scrubbed for the critical portions of the procedure including the exposure and decompression.    Koby Mabry MD

## 2022-09-09 DIAGNOSIS — Z98.890 S/P SPINAL SURGERY: Primary | ICD-10-CM

## 2022-09-26 ENCOUNTER — ANCILLARY PROCEDURE (OUTPATIENT)
Dept: GENERAL RADIOLOGY | Facility: CLINIC | Age: 22
End: 2022-09-26
Attending: ORTHOPAEDIC SURGERY
Payer: COMMERCIAL

## 2022-09-26 ENCOUNTER — OFFICE VISIT (OUTPATIENT)
Dept: ORTHOPEDICS | Facility: CLINIC | Age: 22
End: 2022-09-26
Payer: COMMERCIAL

## 2022-09-26 VITALS — WEIGHT: 212 LBS | HEIGHT: 70 IN | BODY MASS INDEX: 30.35 KG/M2

## 2022-09-26 DIAGNOSIS — M54.17 LUMBOSACRAL RADICULOPATHY AT S1: Primary | ICD-10-CM

## 2022-09-26 DIAGNOSIS — Z98.890 S/P SPINAL SURGERY: ICD-10-CM

## 2022-09-26 PROCEDURE — 99024 POSTOP FOLLOW-UP VISIT: CPT | Performed by: ORTHOPAEDIC SURGERY

## 2022-09-26 PROCEDURE — 72110 X-RAY EXAM L-2 SPINE 4/>VWS: CPT | Performed by: STUDENT IN AN ORGANIZED HEALTH CARE EDUCATION/TRAINING PROGRAM

## 2022-09-26 NOTE — LETTER
9/26/2022         RE: Rishi Noguera  [address] [facility] [address]        Dear Colleague,    Thank you for referring your patient, Rishi Noguera, to the [facility] [place]. Please see a copy of my visit note below.    Spine Surgery Return Clinic Visit      Chief Complaint:   RECHECK (Follow-up 6 wk s/p L5-S1 microdiscectomy.  Patient reports mild soreness R lower back, otherwise feeling much improved.  Patient reports he sleeps well.  No problems with ADLs under current guidelines.)      Interval HPI:  Symptom Profile Including: location of symptoms, onset, severity, exacerbating/alleviating factors, previous treatments:        Rishi Noguera is a 22 year old male who returns today about 6 weeks status post microdiscectomy which was complicated by a spinal fluid leak.  Overall he is doing quite well.  He said he had some numbness and paresthesias in the right calf for a few weeks after surgery but this has resolved.  He is sleeping well and he feels his low back pain and leg pain are basically completely resolved.  He wishes to increase his activities.  No headaches or signs of spinal fluid leak.            Past Medical History:     Past Medical History:   Diagnosis Date     Asthma      Headache      Lumbar disc herniation             Past Surgical History:     Past Surgical History:   Procedure Laterality Date     DISCECTOMY LUMBAR MINIMALLY INVASIVE ONE LEVEL N/A 8/12/2022    Procedure: Minimally Invasive Right Lumbar 5 to Sacral 1 Microdiscectomy;  Surgeon: Koby Mabry MD;  Location: [facility] OR     wisdom teeth extraction              Social History:     Social History     Tobacco Use     Smoking status: Never Smoker     Smokeless tobacco: Never Used   Substance Use Topics     Alcohol use: Not on file            Family History:     Family History   Problem Relation Age of Onset     Anesthesia Reaction No family hx of      Deep Vein Thrombosis (DVT) No family hx of   "           Allergies:   No Known Allergies         Medications:     Current Outpatient Medications   Medication     acetaminophen (TYLENOL) 325 MG tablet     albuterol (PROAIR HFA/PROVENTIL HFA/VENTOLIN HFA) 108 (90 Base) MCG/ACT inhaler     gabapentin (NEURONTIN) 300 MG capsule     ondansetron (ZOFRAN ODT) 4 MG ODT tab     oxyCODONE (ROXICODONE) 5 MG tablet     polyethylene glycol (MIRALAX) 17 GM/Dose powder     senna-docusate (SENOKOT-S/PERICOLACE) 8.6-50 MG tablet     triamcinolone (KENALOG) 0.1 % external cream     No current facility-administered medications for this visit.             Review of Systems:   A focused musculoskeletal and neurologic ROS was performed with pertinent positives and negatives noted in the HPI.  Additional systems were also reviewed and are documented at the bottom of the note.         Physical Exam:   Vitals: Ht 1.77 m (5' 9.69\")   Wt 96.2 kg (212 lb)   BMI 30.69 kg/m    Musculoskeletal, Neurologic, and Spine:        Lumbar Spine:    Appearance - No gross stepoffs or deformities    Motor -     L2-3: Hip flexion 5/5 R and 5/5 L strength          L3/4:  Knee extension R 5/5 and L 5/5 strength         L4/5:  Foot dorsiflexion R 5/5 L 5/5 and               S1:  Plantarflexion/Peroneal Muscles  R 5/5 and L 5/5 strength    Sensation: intact to light touch L3-S1 distribution BLE      Neurologic:      REFLEXES Left Right                  Patella 1+ 1+   Ankle jerk 1+ 1+   Babinski No upgoing great toe No upgoing great toe   Clonus 0 beats 0 beats     Hip Exam:  No pain with hip log roll and no tenderness over the greater trochanters.    Alignment:  Patient stands with a neutral standing sagittal balance.    Incision is well-healed           Imaging:   We ordered and independently reviewed new radiographs at this clinic visit. The results were discussed with the patient. Findings include:     Radiographs show no evidence of complication       Assessment and Plan:     22 year old male with " excellent outcome status post microdiscectomy, may advance activities as tolerated, no restrictions at this time.  We discussed commonsense restrictions regarding long-term health of the spine including using good form when lifting, down commonsense things such as avoiding very heavy axial loading of the spine, he may follow-up as needed.  I am very pleased he is doing well       Respectfully,  Koby Mabry MD  Spine Surgery  [facility]

## 2022-09-28 NOTE — PROGRESS NOTES
Spine Surgery Return Clinic Visit      Chief Complaint:   RECHECK (Follow-up 6 wk s/p L5-S1 microdiscectomy.  Patient reports mild soreness R lower back, otherwise feeling much improved.  Patient reports he sleeps well.  No problems with ADLs under current guidelines.)      Interval HPI:  Symptom Profile Including: location of symptoms, onset, severity, exacerbating/alleviating factors, previous treatments:        Rishi Noguera is a 22 year old male who returns today about 6 weeks status post microdiscectomy which was complicated by a spinal fluid leak.  Overall he is doing quite well.  He said he had some numbness and paresthesias in the right calf for a few weeks after surgery but this has resolved.  He is sleeping well and he feels his low back pain and leg pain are basically completely resolved.  He wishes to increase his activities.  No headaches or signs of spinal fluid leak.            Past Medical History:     Past Medical History:   Diagnosis Date     Asthma      Headache      Lumbar disc herniation             Past Surgical History:     Past Surgical History:   Procedure Laterality Date     DISCECTOMY LUMBAR MINIMALLY INVASIVE ONE LEVEL N/A 8/12/2022    Procedure: Minimally Invasive Right Lumbar 5 to Sacral 1 Microdiscectomy;  Surgeon: Koby Mabry MD;  Location: [facility] OR     wisdom teeth extraction              Social History:     Social History     Tobacco Use     Smoking status: Never Smoker     Smokeless tobacco: Never Used   Substance Use Topics     Alcohol use: Not on file            Family History:     Family History   Problem Relation Age of Onset     Anesthesia Reaction No family hx of      Deep Vein Thrombosis (DVT) No family hx of             Allergies:   No Known Allergies         Medications:     Current Outpatient Medications   Medication     acetaminophen (TYLENOL) 325 MG tablet     albuterol (PROAIR HFA/PROVENTIL HFA/VENTOLIN HFA) 108 (90 Base) MCG/ACT inhaler     gabapentin  "(NEURONTIN) 300 MG capsule     ondansetron (ZOFRAN ODT) 4 MG ODT tab     oxyCODONE (ROXICODONE) 5 MG tablet     polyethylene glycol (MIRALAX) 17 GM/Dose powder     senna-docusate (SENOKOT-S/PERICOLACE) 8.6-50 MG tablet     triamcinolone (KENALOG) 0.1 % external cream     No current facility-administered medications for this visit.             Review of Systems:   A focused musculoskeletal and neurologic ROS was performed with pertinent positives and negatives noted in the HPI.  Additional systems were also reviewed and are documented at the bottom of the note.         Physical Exam:   Vitals: Ht 1.77 m (5' 9.69\")   Wt 96.2 kg (212 lb)   BMI 30.69 kg/m    Musculoskeletal, Neurologic, and Spine:        Lumbar Spine:    Appearance - No gross stepoffs or deformities    Motor -     L2-3: Hip flexion 5/5 R and 5/5 L strength          L3/4:  Knee extension R 5/5 and L 5/5 strength         L4/5:  Foot dorsiflexion R 5/5 L 5/5 and               S1:  Plantarflexion/Peroneal Muscles  R 5/5 and L 5/5 strength    Sensation: intact to light touch L3-S1 distribution BLE      Neurologic:      REFLEXES Left Right                  Patella 1+ 1+   Ankle jerk 1+ 1+   Babinski No upgoing great toe No upgoing great toe   Clonus 0 beats 0 beats     Hip Exam:  No pain with hip log roll and no tenderness over the greater trochanters.    Alignment:  Patient stands with a neutral standing sagittal balance.    Incision is well-healed           Imaging:   We ordered and independently reviewed new radiographs at this clinic visit. The results were discussed with the patient. Findings include:     Radiographs show no evidence of complication       Assessment and Plan:     22 year old male with excellent outcome status post microdiscectomy, may advance activities as tolerated, no restrictions at this time.  We discussed commonsense restrictions regarding long-term health of the spine including using good form when lifting, down commonsense " things such as avoiding very heavy axial loading of the spine, he may follow-up as needed.  I am very pleased he is doing well       Respectfully,  Koby Mabry MD  Spine Surgery  [facility]

## 2022-10-03 ENCOUNTER — HEALTH MAINTENANCE LETTER (OUTPATIENT)
Age: 22
End: 2022-10-03

## 2023-10-22 ENCOUNTER — HEALTH MAINTENANCE LETTER (OUTPATIENT)
Age: 23
End: 2023-10-22

## 2024-12-15 ENCOUNTER — HEALTH MAINTENANCE LETTER (OUTPATIENT)
Age: 24
End: 2024-12-15

## (undated) DEVICE — SU VICRYL 1 CT-1 CR 8X18" J741D

## (undated) DEVICE — TOOL DISSECT MIDAS MR8 12CM TELESC MATCH 2.5 MR8-T12MH25

## (undated) DEVICE — DRSG GAUZE 3X3"

## (undated) DEVICE — ADH SKIN CLOSURE PREMIERPRO EXOFIN 1.0ML 3470

## (undated) DEVICE — GLOVE PROTEXIS BLUE W/NEU-THERA 8.0  2D73EB80

## (undated) DEVICE — POSITIONER ARMBOARD FOAM 1PAIR LF FP-ARMB1

## (undated) DEVICE — SOL ISOPROPYL RUBBING ALCOHOL USP 70% 4OZ HDX-20 I0020

## (undated) DEVICE — LINEN TOWEL PACK X5 5464

## (undated) DEVICE — KNIFE MEDT BAYONETED DISCECTOMY 134MM 1564-00

## (undated) DEVICE — DRSG TEGADERM 4X4 3/4" 1626W

## (undated) DEVICE — SOL NACL 0.9% IRRIG 1000ML BOTTLE 2F7124

## (undated) DEVICE — DRSG GAUZE 4X8" NON21842

## (undated) DEVICE — Device

## (undated) DEVICE — SU MONOCRYL 3-0 PS-2 18" UND Y497G

## (undated) DEVICE — SUCTION MANIFOLD NEPTUNE 2 SYS 4 PORT 0702-020-000

## (undated) DEVICE — DRSG KERLIX 4 1/2"X4YDS ROLL 6730

## (undated) DEVICE — LINEN BACK PACK 5440

## (undated) DEVICE — GLOVE PROTEXIS W/NEU-THERA 7.5  2D73TE75

## (undated) DEVICE — ESU ELEC BLADE 6" COATED/INSULATED E1455-6

## (undated) DEVICE — LINEN GOWN X4 5410

## (undated) DEVICE — SU VICRYL 2-0 CT-2 8X18" UND D8144

## (undated) DEVICE — SPONGE COTTONOID NEURO 1/2"X1/2" 30-054

## (undated) DEVICE — NDL ANGIOCATH 14GA 1.25" 4048

## (undated) DEVICE — SU PROLENE 6-0 BV-1 DA 24" 8805H

## (undated) DEVICE — SPONGE COTTONOID 1X1" 80-1403

## (undated) DEVICE — DRAPE C-ARM W/STRAPS 42X72" 07-CA104

## (undated) DEVICE — SOL WATER IRRIG 1000ML BOTTLE 2F7114

## (undated) DEVICE — SYR 50ML LL W/O NDL 309653

## (undated) RX ORDER — PROPOFOL 10 MG/ML
INJECTION, EMULSION INTRAVENOUS
Status: DISPENSED
Start: 2022-08-12

## (undated) RX ORDER — FENTANYL CITRATE 50 UG/ML
INJECTION, SOLUTION INTRAMUSCULAR; INTRAVENOUS
Status: DISPENSED
Start: 2022-08-12

## (undated) RX ORDER — LIDOCAINE HYDROCHLORIDE 20 MG/ML
INJECTION, SOLUTION EPIDURAL; INFILTRATION; INTRACAUDAL; PERINEURAL
Status: DISPENSED
Start: 2022-08-12

## (undated) RX ORDER — HYDROXYZINE HYDROCHLORIDE 25 MG/1
TABLET, FILM COATED ORAL
Status: DISPENSED
Start: 2022-08-12

## (undated) RX ORDER — ONDANSETRON 2 MG/ML
INJECTION INTRAMUSCULAR; INTRAVENOUS
Status: DISPENSED
Start: 2022-08-12

## (undated) RX ORDER — HYDROMORPHONE HCL IN WATER/PF 6 MG/30 ML
PATIENT CONTROLLED ANALGESIA SYRINGE INTRAVENOUS
Status: DISPENSED
Start: 2022-08-12

## (undated) RX ORDER — CEFAZOLIN SODIUM/WATER 2 G/20 ML
SYRINGE (ML) INTRAVENOUS
Status: DISPENSED
Start: 2022-08-12

## (undated) RX ORDER — ACETAMINOPHEN 325 MG/1
TABLET ORAL
Status: DISPENSED
Start: 2022-08-12

## (undated) RX ORDER — GABAPENTIN 300 MG/1
CAPSULE ORAL
Status: DISPENSED
Start: 2022-08-12

## (undated) RX ORDER — GABAPENTIN 100 MG/1
CAPSULE ORAL
Status: DISPENSED
Start: 2022-08-12

## (undated) RX ORDER — DEXAMETHASONE SODIUM PHOSPHATE 4 MG/ML
INJECTION, SOLUTION INTRA-ARTICULAR; INTRALESIONAL; INTRAMUSCULAR; INTRAVENOUS; SOFT TISSUE
Status: DISPENSED
Start: 2022-08-12

## (undated) RX ORDER — HYDROMORPHONE HYDROCHLORIDE 1 MG/ML
INJECTION, SOLUTION INTRAMUSCULAR; INTRAVENOUS; SUBCUTANEOUS
Status: DISPENSED
Start: 2022-08-12

## (undated) RX ORDER — OXYCODONE HYDROCHLORIDE 5 MG/1
TABLET ORAL
Status: DISPENSED
Start: 2022-08-12